# Patient Record
Sex: MALE | Race: WHITE | NOT HISPANIC OR LATINO | URBAN - METROPOLITAN AREA
[De-identification: names, ages, dates, MRNs, and addresses within clinical notes are randomized per-mention and may not be internally consistent; named-entity substitution may affect disease eponyms.]

---

## 2020-02-04 ENCOUNTER — EMERGENCY (EMERGENCY)
Facility: HOSPITAL | Age: 40
LOS: 0 days | Discharge: HOME | End: 2020-02-05
Attending: EMERGENCY MEDICINE
Payer: COMMERCIAL

## 2020-02-04 VITALS — HEIGHT: 70 IN | WEIGHT: 315 LBS

## 2020-02-04 VITALS — TEMPERATURE: 99 F | RESPIRATION RATE: 18 BRPM | HEART RATE: 100 BPM

## 2020-02-04 DIAGNOSIS — L03.116 CELLULITIS OF LEFT LOWER LIMB: ICD-10-CM

## 2020-02-04 DIAGNOSIS — K40.20 BILATERAL INGUINAL HERNIA, WITHOUT OBSTRUCTION OR GANGRENE, NOT SPECIFIED AS RECURRENT: ICD-10-CM

## 2020-02-04 DIAGNOSIS — R00.0 TACHYCARDIA, UNSPECIFIED: ICD-10-CM

## 2020-02-04 PROCEDURE — 74176 CT ABD & PELVIS W/O CONTRAST: CPT

## 2020-02-04 PROCEDURE — 93010 ELECTROCARDIOGRAM REPORT: CPT

## 2020-02-04 PROCEDURE — 93005 ELECTROCARDIOGRAM TRACING: CPT

## 2020-02-04 PROCEDURE — 99284 EMERGENCY DEPT VISIT MOD MDM: CPT

## 2020-02-04 PROCEDURE — 74176 CT ABD & PELVIS W/O CONTRAST: CPT | Mod: 26

## 2020-02-04 PROCEDURE — 99284 EMERGENCY DEPT VISIT MOD MDM: CPT | Mod: 25

## 2020-02-04 RX ORDER — CEPHALEXIN 500 MG
500 CAPSULE ORAL EVERY 12 HOURS
Refills: 0 | Status: DISCONTINUED | OUTPATIENT
Start: 2020-02-04 | End: 2020-02-05

## 2020-02-04 RX ADMIN — Medication 500 MILLIGRAM(S): at 22:11

## 2020-02-04 NOTE — ED STATDOCS - CARE PLAN
Principal Discharge DX:	Cellulitis of leg, left  Secondary Diagnosis:	Bilateral inguinal hernia without obstruction or gangrene, recurrence not specified

## 2020-02-04 NOTE — ED ADULT TRIAGE NOTE - CHIEF COMPLAINT QUOTE
Patient comes in with abscess on upper left thigh. Patient noticed increase in size 2 days ago. Denies fevers.

## 2020-02-04 NOTE — ED STATDOCS - NSFOLLOWUPINSTRUCTIONS_ED_ALL_ED_FT
Cellulitis    Cellulitis is a skin infection caused by bacteria. This condition occurs most often in the arms and lower legs but can occur anywhere over the body. Symptoms include redness, swelling, warm skin, tenderness, and chills/fever. If you were prescribed an antibiotic medicine, take it as told by your health care provider. Do not stop taking the antibiotic even if you start to feel better.    SEEK IMMEDIATE MEDICAL CARE IF YOU HAVE ANY OF THE FOLLOWING SYMPTOMS: worsening fever, red streaks coming from affected area, vomiting or diarrhea, or dizziness/lightheadedness.    Hernia    A hernia is when fat or the intestines push through a weak area in the abdominal wall. This can occur around the belly button (umbilical hernia) or where the leg meets the lower abdomen (inguinal hernia). This creates a rounded lump (bulge). Hernias that can be pushed back into the belly are called reducible and those that cannot are called incarcerated. Hernias that are not reducible and lose their blood supply are called strangulated and require emergency surgery. Hernias can be caused or worsened when straining during bowel movements or lifting heavy objects as well as coughing or sneezing. Surgery may be helpful in treating this condition so follow up with a surgeon.    SEEK IMMEDIATE MEDICAL CARE IF YOU HAVE ANY OF THE FOLLOWING SYMPTOMS: worsening abdominal pain, change in color over the hernia, nausea/vomiting, or inability to have a bowel movement or pass gas.

## 2020-02-04 NOTE — ED STATDOCS - ATTENDING CONTRIBUTION TO CARE
I, Tyler Burkett, performed the initial face to face bedside interview with this patient regarding history of present illness, review of symptoms and relevant past medical, social and family history.  I completed an independent physical examination.  I was the initial provider who evaluated this patient. I have signed out the follow up of any pending tests (i.e. labs, radiological studies) to the ACP.  I have communicated the patient’s plan of care and disposition with the ACP.  The history, relevant review of systems, past medical and surgical history, medical decision making, and physical examination was documented by the scribe in my presence and I attest to the accuracy of the documentation.

## 2020-02-04 NOTE — ED STATDOCS - CARE PROVIDER_API CALL
Charis Suarez)  Surgery; Surgical Critical Care  [address] [facility], [address] [place] [address]  Phone: [phone]  Fax: [phone]  Follow Up Time: 7-10 Days

## 2020-02-04 NOTE — ED ADULT NURSE NOTE - CHPI ED NUR SYMPTOMS NEG
no chills/no dizziness/no vomiting/no decreased eating/drinking/no fever/no nausea/no tingling/no weakness

## 2020-02-04 NOTE — ED STATDOCS - PATIENT PORTAL LINK FT
You can access the FollowMyHealth Patient Portal offered by [facility] by registering at the following website: [email or web address]. By joining [facility]’s FollowMyHealth portal, you will also be able to view your health information using other applications (apps) compatible with our system.

## 2020-02-04 NOTE — ED STATDOCS - OBJECTIVE STATEMENT
38 y/o male with no pertinent PMHx presents to the ED c/o mass to upper left thigh increasing in size x2 days. States he had an ingrown hair to the area that he would touched and squeezed in the past with pus drainage. Pt reports the mass greatly increased in size over last night, and it is erythematous with mild pain upon palpation. No pain without palpation. Denies abdominal pain. Last BM 1530 today without difficulty.

## 2020-02-04 NOTE — ED ADULT NURSE NOTE - OBJECTIVE STATEMENT
pt presents to the ED for inguinal abscess on left side. pt states the abscess has grown over the past twop days, is tender to the touch, is swollen, and is red, pt denies having any fevers at home. Pt denies drainage from the site at this time.

## 2020-02-04 NOTE — ED ADULT NURSE NOTE - NSIMPLEMENTINTERV_GEN_ALL_ED
Implemented All Universal Safety Interventions:  [place] to call system. Call bell, personal items and telephone within reach. Instruct patient to call for assistance. Room bathroom lighting operational. Non-slip footwear when patient is off stretcher. Physically safe environment: no spills, clutter or unnecessary equipment. Stretcher in lowest position, wheels locked, appropriate side rails in place.

## 2020-02-05 RX ORDER — CEPHALEXIN 500 MG
1 CAPSULE ORAL
Qty: 20 | Refills: 0
Start: 2020-02-05 | End: 2020-02-14

## 2020-02-05 RX ORDER — AZTREONAM 2 G
1 VIAL (EA) INJECTION
Qty: 20 | Refills: 0
Start: 2020-02-05 | End: 2020-02-14

## 2020-02-07 ENCOUNTER — INPATIENT (INPATIENT)
Facility: HOSPITAL | Age: 40
LOS: 5 days | Discharge: HOME | DRG: 854 | End: 2020-02-13
Attending: INTERNAL MEDICINE | Admitting: INTERNAL MEDICINE
Payer: COMMERCIAL

## 2020-02-07 VITALS — WEIGHT: 315 LBS | HEIGHT: 70 IN

## 2020-02-07 DIAGNOSIS — Z98.890 OTHER SPECIFIED POSTPROCEDURAL STATES: Chronic | ICD-10-CM

## 2020-02-07 DIAGNOSIS — Z97.2 PRESENCE OF DENTAL PROSTHETIC DEVICE (COMPLETE) (PARTIAL): Chronic | ICD-10-CM

## 2020-02-07 DIAGNOSIS — S52.123A DISPLACED FRACTURE OF HEAD OF UNSPECIFIED RADIUS, INITIAL ENCOUNTER FOR CLOSED FRACTURE: Chronic | ICD-10-CM

## 2020-02-07 DIAGNOSIS — L03.311 CELLULITIS OF ABDOMINAL WALL: ICD-10-CM

## 2020-02-07 LAB
ALBUMIN SERPL ELPH-MCNC: 3.6 G/DL — SIGNIFICANT CHANGE UP (ref 3.3–5)
ALP SERPL-CCNC: 106 U/L — SIGNIFICANT CHANGE UP (ref 40–120)
ALT FLD-CCNC: 35 U/L — SIGNIFICANT CHANGE UP (ref 12–78)
ANION GAP SERPL CALC-SCNC: 6 MMOL/L — SIGNIFICANT CHANGE UP (ref 5–17)
APTT BLD: 36.3 SEC — SIGNIFICANT CHANGE UP (ref 27.5–36.3)
AST SERPL-CCNC: 12 U/L — LOW (ref 15–37)
BASOPHILS # BLD AUTO: 0.1 K/UL — SIGNIFICANT CHANGE UP (ref 0–0.2)
BASOPHILS NFR BLD AUTO: 0.7 % — SIGNIFICANT CHANGE UP (ref 0–2)
BILIRUB SERPL-MCNC: 0.4 MG/DL — SIGNIFICANT CHANGE UP (ref 0.2–1.2)
BUN SERPL-MCNC: 11 MG/DL — SIGNIFICANT CHANGE UP (ref 7–23)
CALCIUM SERPL-MCNC: 8.9 MG/DL — SIGNIFICANT CHANGE UP (ref 8.5–10.1)
CHLORIDE SERPL-SCNC: 101 MMOL/L — SIGNIFICANT CHANGE UP (ref 96–108)
CO2 SERPL-SCNC: 25 MMOL/L — SIGNIFICANT CHANGE UP (ref 22–31)
CREAT SERPL-MCNC: 1.06 MG/DL — SIGNIFICANT CHANGE UP (ref 0.5–1.3)
EOSINOPHIL # BLD AUTO: 0.42 K/UL — SIGNIFICANT CHANGE UP (ref 0–0.5)
EOSINOPHIL NFR BLD AUTO: 2.9 % — SIGNIFICANT CHANGE UP (ref 0–6)
GLUCOSE SERPL-MCNC: 372 MG/DL — HIGH (ref 70–99)
HCT VFR BLD CALC: 44.3 % — SIGNIFICANT CHANGE UP (ref 39–50)
HGB BLD-MCNC: 14.6 G/DL — SIGNIFICANT CHANGE UP (ref 13–17)
IMM GRANULOCYTES NFR BLD AUTO: 0.4 % — SIGNIFICANT CHANGE UP (ref 0–1.5)
INR BLD: 1.07 RATIO — SIGNIFICANT CHANGE UP (ref 0.88–1.16)
LACTATE SERPL-SCNC: 2.1 MMOL/L — HIGH (ref 0.7–2)
LYMPHOCYTES # BLD AUTO: 17.1 % — SIGNIFICANT CHANGE UP (ref 13–44)
LYMPHOCYTES # BLD AUTO: 2.48 K/UL — SIGNIFICANT CHANGE UP (ref 1–3.3)
MCHC RBC-ENTMCNC: 30 PG — SIGNIFICANT CHANGE UP (ref 27–34)
MCHC RBC-ENTMCNC: 33 GM/DL — SIGNIFICANT CHANGE UP (ref 32–36)
MCV RBC AUTO: 91.2 FL — SIGNIFICANT CHANGE UP (ref 80–100)
MONOCYTES # BLD AUTO: 0.87 K/UL — SIGNIFICANT CHANGE UP (ref 0–0.9)
MONOCYTES NFR BLD AUTO: 6 % — SIGNIFICANT CHANGE UP (ref 2–14)
NEUTROPHILS # BLD AUTO: 10.54 K/UL — HIGH (ref 1.8–7.4)
NEUTROPHILS NFR BLD AUTO: 72.9 % — SIGNIFICANT CHANGE UP (ref 43–77)
PLATELET # BLD AUTO: 395 K/UL — SIGNIFICANT CHANGE UP (ref 150–400)
POTASSIUM SERPL-MCNC: 4.1 MMOL/L — SIGNIFICANT CHANGE UP (ref 3.5–5.3)
POTASSIUM SERPL-SCNC: 4.1 MMOL/L — SIGNIFICANT CHANGE UP (ref 3.5–5.3)
PROT SERPL-MCNC: 8.1 GM/DL — SIGNIFICANT CHANGE UP (ref 6–8.3)
PROTHROM AB SERPL-ACNC: 11.9 SEC — SIGNIFICANT CHANGE UP (ref 10–12.9)
RBC # BLD: 4.86 M/UL — SIGNIFICANT CHANGE UP (ref 4.2–5.8)
RBC # FLD: 11.9 % — SIGNIFICANT CHANGE UP (ref 10.3–14.5)
SODIUM SERPL-SCNC: 132 MMOL/L — LOW (ref 135–145)
WBC # BLD: 14.47 K/UL — HIGH (ref 3.8–10.5)
WBC # FLD AUTO: 14.47 K/UL — HIGH (ref 3.8–10.5)

## 2020-02-07 PROCEDURE — 84100 ASSAY OF PHOSPHORUS: CPT

## 2020-02-07 PROCEDURE — 87070 CULTURE OTHR SPECIMN AEROBIC: CPT

## 2020-02-07 PROCEDURE — 99223 1ST HOSP IP/OBS HIGH 75: CPT | Mod: AI

## 2020-02-07 PROCEDURE — 80202 ASSAY OF VANCOMYCIN: CPT

## 2020-02-07 PROCEDURE — 80048 BASIC METABOLIC PNL TOTAL CA: CPT

## 2020-02-07 PROCEDURE — 83605 ASSAY OF LACTIC ACID: CPT

## 2020-02-07 PROCEDURE — 36415 COLL VENOUS BLD VENIPUNCTURE: CPT

## 2020-02-07 PROCEDURE — 85025 COMPLETE CBC W/AUTO DIFF WBC: CPT

## 2020-02-07 PROCEDURE — 80061 LIPID PANEL: CPT

## 2020-02-07 PROCEDURE — 83735 ASSAY OF MAGNESIUM: CPT

## 2020-02-07 PROCEDURE — 87075 CULTR BACTERIA EXCEPT BLOOD: CPT

## 2020-02-07 PROCEDURE — 83036 HEMOGLOBIN GLYCOSYLATED A1C: CPT

## 2020-02-07 PROCEDURE — 82962 GLUCOSE BLOOD TEST: CPT

## 2020-02-07 PROCEDURE — 85027 COMPLETE CBC AUTOMATED: CPT

## 2020-02-07 PROCEDURE — 74177 CT ABD & PELVIS W/CONTRAST: CPT | Mod: 26

## 2020-02-07 RX ORDER — DEXTROSE 50 % IN WATER 50 %
25 SYRINGE (ML) INTRAVENOUS ONCE
Refills: 0 | Status: DISCONTINUED | OUTPATIENT
Start: 2020-02-07 | End: 2020-02-13

## 2020-02-07 RX ORDER — PIPERACILLIN AND TAZOBACTAM 4; .5 G/20ML; G/20ML
3.38 INJECTION, POWDER, LYOPHILIZED, FOR SOLUTION INTRAVENOUS EVERY 8 HOURS
Refills: 0 | Status: DISCONTINUED | OUTPATIENT
Start: 2020-02-08 | End: 2020-02-08

## 2020-02-07 RX ORDER — PIPERACILLIN AND TAZOBACTAM 4; .5 G/20ML; G/20ML
3.38 INJECTION, POWDER, LYOPHILIZED, FOR SOLUTION INTRAVENOUS ONCE
Refills: 0 | Status: COMPLETED | OUTPATIENT
Start: 2020-02-07 | End: 2020-02-07

## 2020-02-07 RX ORDER — GLUCAGON INJECTION, SOLUTION 0.5 MG/.1ML
1 INJECTION, SOLUTION SUBCUTANEOUS ONCE
Refills: 0 | Status: DISCONTINUED | OUTPATIENT
Start: 2020-02-07 | End: 2020-02-13

## 2020-02-07 RX ORDER — ACETAMINOPHEN 500 MG
650 TABLET ORAL EVERY 6 HOURS
Refills: 0 | Status: DISCONTINUED | OUTPATIENT
Start: 2020-02-07 | End: 2020-02-12

## 2020-02-07 RX ORDER — INSULIN LISPRO 100/ML
VIAL (ML) SUBCUTANEOUS AT BEDTIME
Refills: 0 | Status: DISCONTINUED | OUTPATIENT
Start: 2020-02-07 | End: 2020-02-13

## 2020-02-07 RX ORDER — ONDANSETRON 8 MG/1
4 TABLET, FILM COATED ORAL EVERY 6 HOURS
Refills: 0 | Status: DISCONTINUED | OUTPATIENT
Start: 2020-02-07 | End: 2020-02-13

## 2020-02-07 RX ORDER — SODIUM CHLORIDE 9 MG/ML
1000 INJECTION INTRAMUSCULAR; INTRAVENOUS; SUBCUTANEOUS ONCE
Refills: 0 | Status: COMPLETED | OUTPATIENT
Start: 2020-02-07 | End: 2020-02-07

## 2020-02-07 RX ORDER — VANCOMYCIN HCL 1 G
1000 VIAL (EA) INTRAVENOUS EVERY 12 HOURS
Refills: 0 | Status: DISCONTINUED | OUTPATIENT
Start: 2020-02-08 | End: 2020-02-08

## 2020-02-07 RX ORDER — VANCOMYCIN HCL 1 G
2000 VIAL (EA) INTRAVENOUS ONCE
Refills: 0 | Status: COMPLETED | OUTPATIENT
Start: 2020-02-07 | End: 2020-02-07

## 2020-02-07 RX ORDER — DEXTROSE 50 % IN WATER 50 %
12.5 SYRINGE (ML) INTRAVENOUS ONCE
Refills: 0 | Status: DISCONTINUED | OUTPATIENT
Start: 2020-02-07 | End: 2020-02-13

## 2020-02-07 RX ORDER — VANCOMYCIN HCL 1 G
1000 VIAL (EA) INTRAVENOUS ONCE
Refills: 0 | Status: DISCONTINUED | OUTPATIENT
Start: 2020-02-07 | End: 2020-02-07

## 2020-02-07 RX ORDER — INSULIN LISPRO 100/ML
VIAL (ML) SUBCUTANEOUS
Refills: 0 | Status: DISCONTINUED | OUTPATIENT
Start: 2020-02-07 | End: 2020-02-08

## 2020-02-07 RX ORDER — IBUPROFEN 200 MG
600 TABLET ORAL EVERY 6 HOURS
Refills: 0 | Status: DISCONTINUED | OUTPATIENT
Start: 2020-02-07 | End: 2020-02-13

## 2020-02-07 RX ORDER — DEXTROSE 50 % IN WATER 50 %
15 SYRINGE (ML) INTRAVENOUS ONCE
Refills: 0 | Status: DISCONTINUED | OUTPATIENT
Start: 2020-02-07 | End: 2020-02-13

## 2020-02-07 RX ADMIN — PIPERACILLIN AND TAZOBACTAM 200 GRAM(S): 4; .5 INJECTION, POWDER, LYOPHILIZED, FOR SOLUTION INTRAVENOUS at 20:43

## 2020-02-07 RX ADMIN — SODIUM CHLORIDE 1000 MILLILITER(S): 9 INJECTION INTRAMUSCULAR; INTRAVENOUS; SUBCUTANEOUS at 20:00

## 2020-02-07 RX ADMIN — PIPERACILLIN AND TAZOBACTAM 3.38 GRAM(S): 4; .5 INJECTION, POWDER, LYOPHILIZED, FOR SOLUTION INTRAVENOUS at 21:10

## 2020-02-07 RX ADMIN — Medication 2000 MILLIGRAM(S): at 23:40

## 2020-02-07 RX ADMIN — SODIUM CHLORIDE 1000 MILLILITER(S): 9 INJECTION INTRAMUSCULAR; INTRAVENOUS; SUBCUTANEOUS at 21:00

## 2020-02-07 RX ADMIN — Medication 250 MILLIGRAM(S): at 21:16

## 2020-02-07 RX ADMIN — Medication 600 MILLIGRAM(S): at 22:32

## 2020-02-07 RX ADMIN — SODIUM CHLORIDE 1000 MILLILITER(S): 9 INJECTION INTRAMUSCULAR; INTRAVENOUS; SUBCUTANEOUS at 20:50

## 2020-02-07 NOTE — H&P ADULT - HISTORY OF PRESENT ILLNESS
38 yo M with no pertinent PMH presents with upper thigh and lower abdominal pain and swelling.    He was seen 3 days ago for left upper thigh swelling, and was prescribed a 10 day course of BID Keflex and BID Bactrim, but his skin has gotten more purple/black.    In the ED, he was given Vancomycin 2g IV x1, Zosyn 3.375g IV x1, and NS 1L x2. 40 yo M with no pertinent PMH presents with left upper thigh pain and swelling. His symptoms originally began 4 days ago with swelling and redness. He was seen in the ED the day after and was diagnosed with cellulitis and prescribed a 10 day course of BID Keflex and BID Bactrim. His swelling never truly improved, but today, his pain and redness worsened, along with a "black spot" that developed, making him concerned for necrosis. He denies fevers, chills, nausea, vomiting, abdominal pain, rash, or swelling anywhere else, diarrhea, constipation, blood in stool, dysuria, hematuria, urinary discharge, or problems with sex. He does note a previous ingrown hair at that site that he used to pick at in the past. No trauma to that site. No known personal history of diabetes (mother had diabetes). No weight changes.    In the ED, he was given Vancomycin 2g IV x1, Zosyn 3.375g IV x1, and NS 1L x2.

## 2020-02-07 NOTE — ED ADULT TRIAGE NOTE - CHIEF COMPLAINT QUOTE
pt c/o L. inner thigh cellulitis. reports he was seen in ED on tuesday and dx w/ cellulitis, put on PO abx. now the pain and swelling has worsened and pt states, "there is a black spot" on his inner thigh. ibuprofen taken at 3pm today w/ partial relief.

## 2020-02-07 NOTE — H&P ADULT - NSHPSOCIALHISTORY_GEN_ALL_CORE
Former smoker, 1 pack per day intermittently for 30 years, stopped 1 month ago (1/1/20). Patient vapes occasionally since stopping.  No significant alcohol or drug use.  Sexually active with his girlfriend, does not use protection.  Works for [employer].

## 2020-02-07 NOTE — H&P ADULT - NSHPLABSRESULTS_GEN_ALL_CORE
Labs personally reviewed and interpreted. Notable for leukocytosis (WBC 14.47) with increased absolute neutrophil count but no increased percentage. Hb 14.6, plts 395, INR 1.07, Na slightly low at 132, K 4.1, Cl 101, HCO3 25, BUN/creatinine 11/1.06 (no prior labwork seen). BG markedly elevated at 372 with normal AG of 6. LFTs wnl. Lactate slightly elevated at 2.1.    CT A/P with IV contrast personally reviewed and interpreted. Notable for minimal symmetric subcutaneous fat stranding in the lateral lower abdominal wall bilaterally, which could be due to infection versus mild third spacing of fluid. No focal fluid collection or abscess. Scattered small benign-appearing bilateral inguinal lymph nodes noted.    No EKG obtained today. EKG from 2/4/20 personally reviewed.

## 2020-02-07 NOTE — H&P ADULT - NSICDXFAMILYHX_GEN_ALL_CORE_FT
FAMILY HISTORY:  FH: diabetes mellitus, mother  FH: Parkinson's disease, father  FHx: heart disease, maternal grandfather

## 2020-02-07 NOTE — ED ADULT NURSE REASSESSMENT NOTE - NS ED NURSE REASSESS COMMENT FT1
Vancomycin infusing with no redness, swelling or infiltration noted at the site.  MD Cano in ED evaluating patient at this time.  Patients left thigh and left abdomen, reddened with a dime size circular black spot noted.  the skin is warm to the touch and feels indurated.  Patient denies any drainage from the area.  Patient awaiting a bed at his time.  Will continue to monitor patient's condition.

## 2020-02-07 NOTE — ED STATDOCS - OBJECTIVE STATEMENT
38 y/o male with no pertinent PMHx, presents to the ED c/o cellulitis to left thigh. Pt was seen 4 days ago for sx, and was given abx keflex and bactrim. Today, noticed that it began turning purple and black. Came to [facility] for further evaluation. Denies drainage. Denies hx of DM. Will send pt to main ED for further evaluation.

## 2020-02-07 NOTE — H&P ADULT - ASSESSMENT
38 yo M with no pertinent PMH presents with upper thigh and lower abdominal pain and swelling, found to be due to __________________ 38 yo M with no pertinent PMH presents with left upper thigh pain and swelling, found to be due to cellulitis.    1) Cellulitis of left thigh  - Upper thigh, with pain, failed outpatient PO antibiotic therapy  - No abscess seen on CT, no concern for necrotizing fasciitis at this time  - With dark lesion, cannot rule out other superimposed skin infections (?ecthyma gangrenosum)  - Patient likely has underlying diabetes predisposing to infection  - Will start with broad antibiotic coverage (Vanc/Zosyn) to cover for both MRSA and pseudomonas. Initial Vanc dose weight based, but subsequent doses at 1g Q12, spoke with pharmacy  - F/u Vanc troughs  - Elevate leg  - F/u blood cx  - ID consult    2) Sepsis  - Meets sepsis criteria with leukocytosis and tachycardia on admission  - With mild lactic acidosis, s/p 2L IVFs  - F/u repeat lactate  - s/p Vanc/Zosyn, will continue  - F/u blood cx  - ID consult    3) Lactic acidosis  - Mild, 2.1 on admission  - As noted above, s/p 2L IVFs  - F/u repeat lactate  - Cellulitis workup otherwise noted    4) Hyperglycemia  -  on admission  - Patient acknowledged he drank a soda right before ED arrival, but still suspect patient has undiagnosed diabetes  - Low carb diet  - Check FS QAC + HS with low dose SSI to avoid DKA  - Check A1C and lipid panel in AM    5) Prophylactic measure  - DVT PPX: IMPROVE score of 0, low risk, no pharmacological PPX needed, will give SCDs  - Diet: consistent carb  - Dispo: pending ID eval, improvement in sxs, and abx plan

## 2020-02-07 NOTE — H&P ADULT - NSHPREVIEWOFSYSTEMS_GEN_ALL_CORE
Gen: negative for fevers, chills, weight loss, weight gain, malaise, fatigue  Eyes: no blurred vision or lacrimation  ENT: no tinnitus, vertigo, or decreased hearing  Resp: no wheezing, dyspnea, pleuritic chest pain, hemoptysis, or orthopnea  CV: no chest pain, dyspnea on exertion, or palpitations  GI: no nausea, vomiting, abdominal pain, diarrhea, constipation, melena, or hematochezia  : no dysuria, hematuria, discharge, or incontinence  MSK: + thigh pain. No arthralgias or joint swelling  Neuro: no focal deficits, confusion, weakness, dizziness, tremors, or seizures  Skin: + rash, lesions, edema

## 2020-02-07 NOTE — ED STATDOCS - PROGRESS NOTE DETAILS
Patient seen and evaluated.  +cellulitis to L lower abdomen and upper thigh, failing outpatient abx, no fluid collection on CT, +Leukocytosis, +lactate.  Recommended admission, patient agreeable.  Case endorsed to Dr. Ashley Gonzalez PA-C

## 2020-02-07 NOTE — H&P ADULT - NSHPPHYSICALEXAM_GEN_ALL_CORE
Vital Signs Last 24 Hrs  T(C): 36.9 (07 Feb 2020 22:26), Max: 36.9 (07 Feb 2020 22:26)  T(F): 98.5 (07 Feb 2020 22:26), Max: 98.5 (07 Feb 2020 22:26)  HR: 88 (07 Feb 2020 22:26) (88 - 100)  BP: 132/89 (07 Feb 2020 22:26) (132/89 - 144/85)  BP(mean): 102 (07 Feb 2020 19:09) (102 - 102)  RR: 18 (07 Feb 2020 22:26) (18 - 18)  SpO2: 100% (07 Feb 2020 22:26) (97% - 100%) Vital Signs Last 24 Hrs  T(C): 36.9 (07 Feb 2020 22:26), Max: 36.9 (07 Feb 2020 22:26)  T(F): 98.5 (07 Feb 2020 22:26), Max: 98.5 (07 Feb 2020 22:26)  HR: 88 (07 Feb 2020 22:26) (88 - 100)  BP: 132/89 (07 Feb 2020 22:26) (132/89 - 144/85)  BP(mean): 102 (07 Feb 2020 19:09) (102 - 102)  RR: 18 (07 Feb 2020 22:26) (18 - 18)  SpO2: 100% (07 Feb 2020 22:26) (97% - 100%)    GENERAL: No acute distress, morbidly obese  HEENT: PERRL, EOMI, MM dry, no oropharyngeal lesions  NECK: supple, no stiffness, no JVD, no thyromegaly  PULM: respirations non-labored, clear to auscultation bilaterally, no rales, rhonchi, or wheezes  CV: regular rate and rhythm, no murmurs, gallops, or rubs  GI: abdomen soft, nontender, nondistended, no masses felt, normal bowel sounds. No obvious hernias appreciated.  : no genital lesions or ulcers  MSK: strength 5/5 bilateral upper/lower extremities. No joint swelling, erythema, or warmth.  LYMPH: no anterior cervical, posterior cervical, supraclavicular, axillary, or inguinal lymphadenopathy  NEURO: A&Ox3, no tremors, sensation intact  SKIN: Large left upper medial thigh swelling, warmth, and redness, with large focal upper area of induration, and pain. Focal dark-grey lesion about 2x3cm within the upper part of the thigh swelling, without drainage or TTP at that side. No fluctuance appreciated. Trace bilateral lower leg edema with hyperpigmented non-raised lower leg lesions

## 2020-02-08 LAB
ANION GAP SERPL CALC-SCNC: 6 MMOL/L — SIGNIFICANT CHANGE UP (ref 5–17)
BASOPHILS # BLD AUTO: 0.09 K/UL — SIGNIFICANT CHANGE UP (ref 0–0.2)
BASOPHILS NFR BLD AUTO: 0.8 % — SIGNIFICANT CHANGE UP (ref 0–2)
BUN SERPL-MCNC: 9 MG/DL — SIGNIFICANT CHANGE UP (ref 7–23)
CALCIUM SERPL-MCNC: 8.3 MG/DL — LOW (ref 8.5–10.1)
CHLORIDE SERPL-SCNC: 103 MMOL/L — SIGNIFICANT CHANGE UP (ref 96–108)
CHOLEST SERPL-MCNC: 182 MG/DL — SIGNIFICANT CHANGE UP (ref 10–199)
CO2 SERPL-SCNC: 25 MMOL/L — SIGNIFICANT CHANGE UP (ref 22–31)
CREAT SERPL-MCNC: 0.8 MG/DL — SIGNIFICANT CHANGE UP (ref 0.5–1.3)
EOSINOPHIL # BLD AUTO: 0.51 K/UL — HIGH (ref 0–0.5)
EOSINOPHIL NFR BLD AUTO: 4.3 % — SIGNIFICANT CHANGE UP (ref 0–6)
GLUCOSE SERPL-MCNC: 244 MG/DL — HIGH (ref 70–99)
HBA1C BLD-MCNC: 10.6 % — HIGH (ref 4–5.6)
HCT VFR BLD CALC: 41.2 % — SIGNIFICANT CHANGE UP (ref 39–50)
HDLC SERPL-MCNC: 35 MG/DL — LOW
HGB BLD-MCNC: 13.8 G/DL — SIGNIFICANT CHANGE UP (ref 13–17)
IMM GRANULOCYTES NFR BLD AUTO: 0.8 % — SIGNIFICANT CHANGE UP (ref 0–1.5)
LIPID PNL WITH DIRECT LDL SERPL: 129 MG/DL — HIGH
LYMPHOCYTES # BLD AUTO: 1.8 K/UL — SIGNIFICANT CHANGE UP (ref 1–3.3)
LYMPHOCYTES # BLD AUTO: 15.1 % — SIGNIFICANT CHANGE UP (ref 13–44)
MAGNESIUM SERPL-MCNC: 1.9 MG/DL — SIGNIFICANT CHANGE UP (ref 1.6–2.6)
MCHC RBC-ENTMCNC: 30.7 PG — SIGNIFICANT CHANGE UP (ref 27–34)
MCHC RBC-ENTMCNC: 33.5 GM/DL — SIGNIFICANT CHANGE UP (ref 32–36)
MCV RBC AUTO: 91.6 FL — SIGNIFICANT CHANGE UP (ref 80–100)
MONOCYTES # BLD AUTO: 0.91 K/UL — HIGH (ref 0–0.9)
MONOCYTES NFR BLD AUTO: 7.6 % — SIGNIFICANT CHANGE UP (ref 2–14)
NEUTROPHILS # BLD AUTO: 8.55 K/UL — HIGH (ref 1.8–7.4)
NEUTROPHILS NFR BLD AUTO: 71.4 % — SIGNIFICANT CHANGE UP (ref 43–77)
PHOSPHATE SERPL-MCNC: 2.9 MG/DL — SIGNIFICANT CHANGE UP (ref 2.5–4.5)
PLATELET # BLD AUTO: 329 K/UL — SIGNIFICANT CHANGE UP (ref 150–400)
POTASSIUM SERPL-MCNC: 3.9 MMOL/L — SIGNIFICANT CHANGE UP (ref 3.5–5.3)
POTASSIUM SERPL-SCNC: 3.9 MMOL/L — SIGNIFICANT CHANGE UP (ref 3.5–5.3)
RBC # BLD: 4.5 M/UL — SIGNIFICANT CHANGE UP (ref 4.2–5.8)
RBC # FLD: 12.1 % — SIGNIFICANT CHANGE UP (ref 10.3–14.5)
SODIUM SERPL-SCNC: 134 MMOL/L — LOW (ref 135–145)
TOTAL CHOLESTEROL/HDL RATIO MEASUREMENT: 5.3 RATIO — SIGNIFICANT CHANGE UP (ref 3.4–9.6)
TRIGL SERPL-MCNC: 91 MG/DL — SIGNIFICANT CHANGE UP (ref 10–149)
WBC # BLD: 11.95 K/UL — HIGH (ref 3.8–10.5)
WBC # FLD AUTO: 11.95 K/UL — HIGH (ref 3.8–10.5)

## 2020-02-08 PROCEDURE — 99232 SBSQ HOSP IP/OBS MODERATE 35: CPT

## 2020-02-08 RX ORDER — INSULIN LISPRO 100/ML
VIAL (ML) SUBCUTANEOUS
Refills: 0 | Status: DISCONTINUED | OUTPATIENT
Start: 2020-02-08 | End: 2020-02-13

## 2020-02-08 RX ORDER — SODIUM CHLORIDE 9 MG/ML
1000 INJECTION, SOLUTION INTRAVENOUS
Refills: 0 | Status: DISCONTINUED | OUTPATIENT
Start: 2020-02-08 | End: 2020-02-13

## 2020-02-08 RX ORDER — INFLUENZA VIRUS VACCINE 15; 15; 15; 15 UG/.5ML; UG/.5ML; UG/.5ML; UG/.5ML
0.5 SUSPENSION INTRAMUSCULAR ONCE
Refills: 0 | Status: DISCONTINUED | OUTPATIENT
Start: 2020-02-08 | End: 2020-02-13

## 2020-02-08 RX ORDER — CEFTRIAXONE 500 MG/1
2000 INJECTION, POWDER, FOR SOLUTION INTRAMUSCULAR; INTRAVENOUS EVERY 24 HOURS
Refills: 0 | Status: DISCONTINUED | OUTPATIENT
Start: 2020-02-08 | End: 2020-02-13

## 2020-02-08 RX ORDER — VANCOMYCIN HCL 1 G
1250 VIAL (EA) INTRAVENOUS EVERY 12 HOURS
Refills: 0 | Status: DISCONTINUED | OUTPATIENT
Start: 2020-02-08 | End: 2020-02-09

## 2020-02-08 RX ADMIN — Medication 2: at 10:13

## 2020-02-08 RX ADMIN — Medication 2: at 17:27

## 2020-02-08 RX ADMIN — Medication 166.67 MILLIGRAM(S): at 18:54

## 2020-02-08 RX ADMIN — Medication 250 MILLIGRAM(S): at 09:51

## 2020-02-08 RX ADMIN — Medication 2: at 14:16

## 2020-02-08 RX ADMIN — Medication 600 MILLIGRAM(S): at 17:25

## 2020-02-08 RX ADMIN — CEFTRIAXONE 2000 MILLIGRAM(S): 500 INJECTION, POWDER, FOR SOLUTION INTRAMUSCULAR; INTRAVENOUS at 12:37

## 2020-02-08 RX ADMIN — PIPERACILLIN AND TAZOBACTAM 25 GRAM(S): 4; .5 INJECTION, POWDER, LYOPHILIZED, FOR SOLUTION INTRAVENOUS at 05:27

## 2020-02-08 RX ADMIN — Medication 600 MILLIGRAM(S): at 15:08

## 2020-02-08 NOTE — CONSULT NOTE ADULT - ASSESSMENT
40 y/o Male with no pertinent PMH was admitted on 2/7 with left upper thigh pain and swelling. His symptoms originally began 4 days PTA with swelling and redness. He was seen in the ED the day after and was diagnosed with cellulitis and prescribed BID Keflex and BID Bactrim. His swelling never truly improved, but today, his pain and redness worsened, along with a "black spot" that developed, making him concerned for necrosis. He denies fevers, chills at home. He does note a previous ingrown hair at that site that he used to pick at in the past. No trauma to that site. In ER he received vancomycin IV and zosyn.     1. Left LE cellulitis. 40 y/o Male with h/o obesity was admitted on 2/7 with left upper thigh pain and swelling. His symptoms originally began 4 days PTA with swelling and redness. He was seen in the ED the day after and was diagnosed with cellulitis and prescribed BID Keflex and BID Bactrim. His swelling never truly improved, but today, his pain and redness worsened, along with a "black spot" that developed, making him concerned for necrosis. He denies fevers, chills at home. He does note a previous ingrown hair at that site that he used to pick at in the past. No trauma to that site. In ER he received vancomycin IV and zosyn.     1. Left LE cellulitis. Possible early abscess. Possible MRSA infection. Obesity.   -leukocytosis  -obtain BC x 2  -would culture collected  -start vancomycin 1250 mg IV q12h and ceftriaxone 2 gm IV qd  -reason for abx use and side effects reviewed with patient; monitor BMP and vancomycin trough levels   -local wound care  -old chart reviewed to assess prior cultures  -monitor temps  -f/u CBC  -supportive care  2. Other issues:   -care per medicine

## 2020-02-08 NOTE — CONSULT NOTE ADULT - SUBJECTIVE AND OBJECTIVE BOX
Patient is a 39y old  Male who presents with a chief complaint of cellulitis     HPI:  38 y/o Male with no pertinent PMH was admitted on 2/7 with left upper thigh pain and swelling. His symptoms originally began 4 days PTA with swelling and redness. He was seen in the ED the day after and was diagnosed with cellulitis and prescribed BID Keflex and BID Bactrim. His swelling never truly improved, but today, his pain and redness worsened, along with a "black spot" that developed, making him concerned for necrosis. He denies fevers, chills at home. He does note a previous ingrown hair at that site that he used to pick at in the past. No trauma to that site. In ER he received vancomycin IV and zosyn.     PMH: as above  PSH: as above  Meds: per reconciliation sheet, noted below  MEDICATIONS  (STANDING):  dextrose 50% Injectable 12.5 Gram(s) IV Push once  dextrose 50% Injectable 25 Gram(s) IV Push once  dextrose 50% Injectable 25 Gram(s) IV Push once  influenza   Vaccine 0.5 milliLiter(s) IntraMuscular once  insulin lispro (HumaLOG) corrective regimen sliding scale   SubCutaneous three times a day before meals  insulin lispro (HumaLOG) corrective regimen sliding scale   SubCutaneous at bedtime  piperacillin/tazobactam IVPB.. 3.375 Gram(s) IV Intermittent every 8 hours  vancomycin  IVPB 1000 milliGRAM(s) IV Intermittent every 12 hours    MEDICATIONS  (PRN):  acetaminophen   Tablet .. 650 milliGRAM(s) Oral every 6 hours PRN Mild Pain (1 - 3)  dextrose 40% Gel 15 Gram(s) Oral once PRN Blood Glucose LESS THAN 70 milliGRAM(s)/deciliter  glucagon  Injectable 1 milliGRAM(s) IntraMuscular once PRN Glucose LESS THAN 70 milligrams/deciliter  ibuprofen  Tablet. 600 milliGRAM(s) Oral every 6 hours PRN Mild Pain (1 - 3)  ondansetron Injectable 4 milliGRAM(s) IV Push every 6 hours PRN Nausea and/or Vomiting    Allergies    No Known Allergies    Intolerances      Social: no smoking, no alcohol, no illegal drugs; no recent travel, no exposure to TB  FAMILY HISTORY:  FH: Parkinson's disease: father  FHx: heart disease: maternal grandfather  FH: diabetes mellitus: mother    no history of premature cardiovascular disease in first degree relatives    ROS: the patient denies fever, no chills, no HA, no seizures, no dizziness, no sore throat, no nasal congestion, no blurry vision, no CP, no palpitations, no SOB, no cough, no abdominal pain, no diarrhea, no N/V, no dysuria, no leg pain, no claudication, no rash, no joint aches, no rectal pain or bleeding, no night sweats  All other systems reviewed and are negative    Vital Signs Last 24 Hrs  T(C): 36.8 (08 Feb 2020 05:25), Max: 37.1 (08 Feb 2020 00:05)  T(F): 98.3 (08 Feb 2020 05:25), Max: 98.7 (08 Feb 2020 00:05)  HR: 91 (08 Feb 2020 05:25) (88 - 100)  BP: 109/63 (08 Feb 2020 05:25) (109/63 - 147/85)  BP(mean): 102 (07 Feb 2020 19:09) (102 - 102)  RR: 16 (08 Feb 2020 05:25) (16 - 18)  SpO2: 97% (08 Feb 2020 05:25) (97% - 100%)  Daily Height in cm: 177.8 (07 Feb 2020 18:51)    Daily     PE:    Constitutional:  No acute distress  HEENT: NC/AT, EOMI, PERRLA, conjunctivae clear; ears and nose atraumatic; pharynx benign  Neck: supple; thyroid not palpable  Back: no tenderness  Respiratory: respiratory effort normal; clear to auscultation  Cardiovascular: S1S2 regular, no murmurs  Abdomen: soft, not tender, not distended, positive BS; no liver or spleen organomegaly  Genitourinary: no suprapubic tenderness  Lymphatic: no LN palpable  Musculoskeletal: no muscle tenderness, no joint swelling or tenderness  Extremities: no pedal edema  Neurological/ Psychiatric: AxOx3, judgement and insight normal; moving all extremities  Skin: no rashes; no palpable lesions    Labs: all available labs reviewed                        14.6   14.47 )-----------( 395      ( 07 Feb 2020 19:58 )             44.3     02-07    132<L>  |  101  |  11  ----------------------------<  372<H>  4.1   |  25  |  1.06    Ca    8.9      07 Feb 2020 19:58    TPro  8.1  /  Alb  3.6  /  TBili  0.4  /  DBili  x   /  AST  12<L>  /  ALT  35  /  AlkPhos  106  02-07     LIVER FUNCTIONS - ( 07 Feb 2020 19:58 )  Alb: 3.6 g/dL / Pro: 8.1 gm/dL / ALK PHOS: 106 U/L / ALT: 35 U/L / AST: 12 U/L / GGT: x                 Radiology: all available radiological tests reviewed    Advanced directives addressed: full resuscitation Patient is a 39y old  Male who presents with a chief complaint of cellulitis     HPI:  38 y/o Male with h/o obesity was admitted on 2/7 with left upper thigh pain and swelling. His symptoms originally began 4 days PTA with swelling and redness. He was seen in the ED the day after and was diagnosed with cellulitis and prescribed BID Keflex and BID Bactrim. His swelling never truly improved, but today, his pain and redness worsened, along with a "black spot" that developed, making him concerned for necrosis. He denies fevers, chills at home. He does note a previous ingrown hair at that site that he used to pick at in the past. No trauma to that site. In ER he received vancomycin IV and zosyn.     PMH: as above  PSH: as above  Meds: per reconciliation sheet, noted below  MEDICATIONS  (STANDING):  dextrose 50% Injectable 12.5 Gram(s) IV Push once  dextrose 50% Injectable 25 Gram(s) IV Push once  dextrose 50% Injectable 25 Gram(s) IV Push once  influenza   Vaccine 0.5 milliLiter(s) IntraMuscular once  insulin lispro (HumaLOG) corrective regimen sliding scale   SubCutaneous three times a day before meals  insulin lispro (HumaLOG) corrective regimen sliding scale   SubCutaneous at bedtime  piperacillin/tazobactam IVPB.. 3.375 Gram(s) IV Intermittent every 8 hours  vancomycin  IVPB 1000 milliGRAM(s) IV Intermittent every 12 hours    MEDICATIONS  (PRN):  acetaminophen   Tablet .. 650 milliGRAM(s) Oral every 6 hours PRN Mild Pain (1 - 3)  dextrose 40% Gel 15 Gram(s) Oral once PRN Blood Glucose LESS THAN 70 milliGRAM(s)/deciliter  glucagon  Injectable 1 milliGRAM(s) IntraMuscular once PRN Glucose LESS THAN 70 milligrams/deciliter  ibuprofen  Tablet. 600 milliGRAM(s) Oral every 6 hours PRN Mild Pain (1 - 3)  ondansetron Injectable 4 milliGRAM(s) IV Push every 6 hours PRN Nausea and/or Vomiting    Allergies    No Known Allergies    Intolerances      Social: no smoking, no alcohol, no illegal drugs; no recent travel, no exposure to TB  FAMILY HISTORY:  FH: Parkinson's disease: father  FHx: heart disease: maternal grandfather  FH: diabetes mellitus: mother    no history of premature cardiovascular disease in first degree relatives    ROS: the patient denies fever, no chills, no HA, no seizures, no dizziness, no sore throat, no nasal congestion, no blurry vision, no CP, no palpitations, no SOB, no cough, no abdominal pain, no diarrhea, no N/V, no dysuria, has left thigh pain, no claudication, has left thigh rash, no joint aches, no rectal pain or bleeding, no night sweats  All other systems reviewed and are negative    Vital Signs Last 24 Hrs  T(C): 36.8 (08 Feb 2020 05:25), Max: 37.1 (08 Feb 2020 00:05)  T(F): 98.3 (08 Feb 2020 05:25), Max: 98.7 (08 Feb 2020 00:05)  HR: 91 (08 Feb 2020 05:25) (88 - 100)  BP: 109/63 (08 Feb 2020 05:25) (109/63 - 147/85)  BP(mean): 102 (07 Feb 2020 19:09) (102 - 102)  RR: 16 (08 Feb 2020 05:25) (16 - 18)  SpO2: 97% (08 Feb 2020 05:25) (97% - 100%)  Daily Height in cm: 177.8 (07 Feb 2020 18:51)    Daily     PE:    Constitutional:  No acute distress  HEENT: NC/AT, EOMI, PERRLA, conjunctivae clear; ears and nose atraumatic; pharynx benign  Neck: supple; thyroid not palpable  Back: no tenderness  Respiratory: respiratory effort normal; clear to auscultation  Cardiovascular: S1S2 regular, no murmurs  Abdomen: soft, not tender, not distended, positive BS; no liver or spleen organomegaly  Genitourinary: no suprapubic tenderness  Lymphatic: no LN palpable  Musculoskeletal: no muscle tenderness, no joint swelling or tenderness  Extremities: no pedal edema  Left medial thigh area or redness, induration, tenderness, warmth; broken blister noted with some superficial necrosis; scant drainage; no fluctuence  Neurological/ Psychiatric: AxOx3, judgement and insight normal; moving all extremities  Skin: no rashes; no palpable lesions    Labs: all available labs reviewed                        14.6   14.47 )-----------( 395      ( 07 Feb 2020 19:58 )             44.3     02-07    132<L>  |  101  |  11  ----------------------------<  372<H>  4.1   |  25  |  1.06    Ca    8.9      07 Feb 2020 19:58    TPro  8.1  /  Alb  3.6  /  TBili  0.4  /  DBili  x   /  AST  12<L>  /  ALT  35  /  AlkPhos  106  02-07     LIVER FUNCTIONS - ( 07 Feb 2020 19:58 )  Alb: 3.6 g/dL / Pro: 8.1 gm/dL / ALK PHOS: 106 U/L / ALT: 35 U/L / AST: 12 U/L / GGT: x                 Radiology: all available radiological tests reviewed    Advanced directives addressed: full resuscitation

## 2020-02-08 NOTE — PATIENT PROFILE ADULT - FUNCTIONAL SCREEN CURRENT LEVEL: EATING, MLM
Mom lvm requesting lab order for pt before the next upcoming visit on Tuesday 11/27/19 she would like a call back once orders are placed    0 = independent

## 2020-02-08 NOTE — PROGRESS NOTE ADULT - SUBJECTIVE AND OBJECTIVE BOX
HOSPITALIST PROGRESS NOTE:  SUBJECTIVE:  PCP:  Chief Complaint: Patient is a 39y old  Male who presents with a chief complaint of cellulitis (07 Feb 2020 22:44)      HPI:  38 yo M with no pertinent PMH presents with left upper thigh pain and swelling. His symptoms originally began 4 days ago with swelling and redness. He was seen in the ED the day after and was diagnosed with cellulitis and prescribed a 10 day course of BID Keflex and BID Bactrim. His swelling never truly improved, but today, his pain and redness worsened, along with a "black spot" that developed, making him concerned for necrosis. He denies fevers, chills, nausea, vomiting, abdominal pain, rash, or swelling anywhere else, diarrhea, constipation, blood in stool, dysuria, hematuria, urinary discharge, or problems with sex. He does note a previous ingrown hair at that site that he used to pick at in the past. No trauma to that site. No known personal history of diabetes (mother had diabetes). No weight changes.  In the ED, he was given Vancomycin 2g IV x1, Zosyn 3.375g IV x1, and NS 1L x2. (07 Feb 2020 22:44)    2/8:  Above reviewed;     Allergies:  No Known Allergies    REVIEW OF SYSTEMS:  See HPI. All other review of systems is negative unless indicated above.     OBJECTIVE  Physical Exam:  Vital Signs:  Height (cm): 177.8 (02-07 @ 18:51)  Weight (kg): 158.8 (02-07 @ 18:51)  BMI (kg/m2): 50.2 (02-07 @ 18:51)  BSA (m2): 2.65 (02-07 @ 18:51)  Vital Signs Last 24 Hrs  T(C): 36.8 (08 Feb 2020 05:25), Max: 37.1 (08 Feb 2020 00:05)  T(F): 98.3 (08 Feb 2020 05:25), Max: 98.7 (08 Feb 2020 00:05)  HR: 91 (08 Feb 2020 05:25) (88 - 100)  BP: 109/63 (08 Feb 2020 05:25) (109/63 - 147/85)  BP(mean): 102 (07 Feb 2020 19:09) (102 - 102)  RR: 16 (08 Feb 2020 05:25) (16 - 18)  SpO2: 97% (08 Feb 2020 05:25) (97% - 100%)  I&O's Summary      Constitutional: NAD, awake and alert, well-developed  Neurological: AAO x 3, no focal deficits  HEENT: PERRLA, EOMI, MMM  Neck: Soft and supple, No LAD, No JVD  Respiratory: Breath sounds are clear bilaterally, No wheezing, rales or rhonchi  Cardiovascular: S1 and S2, regular rate and rhythm; no Murmurs, gallops or rubs  Gastrointestinal: Bowel Sounds present, soft, nontender, nondistended, no guarding, no rebound tenderness  Back: No CVA tenderness   Extremities: No peripheral edema  Vascular: 2+ peripheral pulses  Musculoskeletal: 5/5 strength b/l upper and lower extremities  Skin: No rashes  Breast: Deferred  Rectal: Deferred    MEDICATIONS  (STANDING):  dextrose 50% Injectable 12.5 Gram(s) IV Push once  dextrose 50% Injectable 25 Gram(s) IV Push once  dextrose 50% Injectable 25 Gram(s) IV Push once  influenza   Vaccine 0.5 milliLiter(s) IntraMuscular once  insulin lispro (HumaLOG) corrective regimen sliding scale   SubCutaneous three times a day before meals  insulin lispro (HumaLOG) corrective regimen sliding scale   SubCutaneous at bedtime  piperacillin/tazobactam IVPB.. 3.375 Gram(s) IV Intermittent every 8 hours  vancomycin  IVPB 1000 milliGRAM(s) IV Intermittent every 12 hours      LABS: All Labs Reviewed:                        14.6   14.47 )-----------( 395      ( 07 Feb 2020 19:58 )             44.3     02-07    132<L>  |  101  |  11  ----------------------------<  372<H>  4.1   |  25  |  1.06    Ca    8.9      07 Feb 2020 19:58    TPro  8.1  /  Alb  3.6  /  TBili  0.4  /  DBili  x   /  AST  12<L>  /  ALT  35  /  AlkPhos  106  02-07    PT/INR - ( 07 Feb 2020 19:58 )   PT: 11.9 sec;   INR: 1.07 ratio         PTT - ( 07 Feb 2020 19:58 )  PTT:36.3 sec      RADIOLOGY/EKG:    < from: CT Abdomen and Pelvis No Cont (02.04.20 @ 23:17) >    IMPRESSION:    Subcutaneous fat stranding in the upper left leg with scattered bilateral inguinal nodes may be post traumatic or due to cellulitis. Correlate clinically.    Small right greater than left fat-containing inguinal hernias. No soft tissue mass.    < end of copied text >    < from: CT Abdomen and Pelvis No Cont (02.04.20 @ 23:17) >    IMPRESSION:    Subcutaneous fat stranding in the upper left leg with scattered bilateral inguinal nodes may be post traumatic or due to cellulitis. Correlate clinically.    Small right greater than left fat-containing inguinal hernias. No soft tissue mass.          < end of copied text > HOSPITALIST PROGRESS NOTE:  SUBJECTIVE:  PCP:  Chief Complaint: Patient is a 39y old  Male who presents with a chief complaint of cellulitis (07 Feb 2020 22:44)      HPI:  38 yo M with no pertinent PMH presents with left upper thigh pain and swelling. His symptoms originally began 4 days ago with swelling and redness. He was seen in the ED the day after and was diagnosed with cellulitis and prescribed a 10 day course of BID Keflex and BID Bactrim. His swelling never truly improved, but today, his pain and redness worsened, along with a "black spot" that developed, making him concerned for necrosis. He denies fevers, chills, nausea, vomiting, abdominal pain, rash, or swelling anywhere else, diarrhea, constipation, blood in stool, dysuria, hematuria, urinary discharge, or problems with sex. He does note a previous ingrown hair at that site that he used to pick at in the past. No trauma to that site. No known personal history of diabetes (mother had diabetes). No weight changes.  In the ED, he was given Vancomycin 2g IV x1, Zosyn 3.375g IV x1, and NS 1L x2. (07 Feb 2020 22:44)    2/8:  Above reviewed; patient has no complaints.  REdness and pain still present in LLE    Allergies:  No Known Allergies    REVIEW OF SYSTEMS:  See HPI. All other review of systems is negative unless indicated above.     OBJECTIVE  Physical Exam:  Vital Signs Last 24 Hrs  T(C): 36.8 (08 Feb 2020 11:10), Max: 37.1 (08 Feb 2020 00:05)  T(F): 98.2 (08 Feb 2020 11:10), Max: 98.7 (08 Feb 2020 00:05)  HR: 87 (08 Feb 2020 11:10) (87 - 100)  BP: 142/86 (08 Feb 2020 11:10) (109/63 - 147/85)  BP(mean): 102 (07 Feb 2020 19:09) (102 - 102)  RR: 16 (08 Feb 2020 11:10) (16 - 18)  SpO2: 97% (08 Feb 2020 11:10) (97% - 100%)      Constitutional: NAD, awake and alert, well-developed  Neurological: AAO x 3, no focal deficits  HEENT: PERRLA, EOMI, MMM  Neck: Soft and supple, No LAD, No JVD  Respiratory: Breath sounds are clear bilaterally, No wheezing, rales or rhonchi  Cardiovascular: S1 and S2, regular rate and rhythm; no Murmurs, gallops or rubs  Gastrointestinal: Bowel Sounds present, soft, nontender, nondistended, no guarding, no rebound tenderness  Back: No CVA tenderness   Extremities: No peripheral edema; Left inner medial + redness, induration, tenderness, warmth; broken blister thigh   Vascular: 2+ peripheral pulses  Musculoskeletal: 5/5 strength b/l upper and lower extremities  Skin: No rashes  Breast: Deferred  Rectal: Deferred    MEDICATIONS  (STANDING):  dextrose 50% Injectable 12.5 Gram(s) IV Push once  dextrose 50% Injectable 25 Gram(s) IV Push once  dextrose 50% Injectable 25 Gram(s) IV Push once  influenza   Vaccine 0.5 milliLiter(s) IntraMuscular once  insulin lispro (HumaLOG) corrective regimen sliding scale   SubCutaneous three times a day before meals  insulin lispro (HumaLOG) corrective regimen sliding scale   SubCutaneous at bedtime  piperacillin/tazobactam IVPB.. 3.375 Gram(s) IV Intermittent every 8 hours  vancomycin  IVPB 1000 milliGRAM(s) IV Intermittent every 12 hours      LABS: All Labs Reviewed:                        14.6   14.47 )-----------( 395      ( 07 Feb 2020 19:58 )             44.3     02-07    132<L>  |  101  |  11  ----------------------------<  372<H>  4.1   |  25  |  1.06    Ca    8.9      07 Feb 2020 19:58    TPro  8.1  /  Alb  3.6  /  TBili  0.4  /  DBili  x   /  AST  12<L>  /  ALT  35  /  AlkPhos  106  02-07    PT/INR - ( 07 Feb 2020 19:58 )   PT: 11.9 sec;   INR: 1.07 ratio         PTT - ( 07 Feb 2020 19:58 )  PTT:36.3 sec      RADIOLOGY/EKG:    < from: CT Abdomen and Pelvis No Cont (02.04.20 @ 23:17) >    IMPRESSION:    Subcutaneous fat stranding in the upper left leg with scattered bilateral inguinal nodes may be post traumatic or due to cellulitis. Correlate clinically.    Small right greater than left fat-containing inguinal hernias. No soft tissue mass.    < end of copied text >    < from: CT Abdomen and Pelvis No Cont (02.04.20 @ 23:17) >    IMPRESSION:    Subcutaneous fat stranding in the upper left leg with scattered bilateral inguinal nodes may be post traumatic or due to cellulitis. Correlate clinically.    Small right greater than left fat-containing inguinal hernias. No soft tissue mass.          < end of copied text >

## 2020-02-08 NOTE — PROGRESS NOTE ADULT - ASSESSMENT
40 yo M with no pertinent PMH presents with left upper thigh pain and swelling, found to be due to cellulitis.    #Sepsis 2ndry to Cellulitis of left thigh  - Meets sepsis criteria with leukocytosis and tachycardia on admission  - Upper thigh, with pain, failed outpatient PO antibiotic therapy  - No abscess seen on CT, no concern for necrotizing fasciitis at this time  - With mild lactic acidosis, s/p 2L IVFs  - repeat lactate WNL  - continue Vanc/Zosyn  - F/u blood cx  - ID consult    #Lactic acidosis  resolved   - Mild, 2.1 , repeat WNL  - s/p 2L IVFs  - Cellulitis workup otherwise noted    #Hyperglycemia  -  on admission  - Patient acknowledged he drank a soda right before ED arrival, but still suspect patient has undiagnosed diabetes  - Low carb diet  - Check FS QAC + HS with low dose SSI to avoid DKA  - Check A1C and lipid panel in AM    #Prophylactic measure  - DVT PPX: IMPROVE score of 0, low risk, no pharmacological PPX needed, will give SCDs 38 yo M with no pertinent PMH presents with left upper thigh pain and swelling, found to be due to cellulitis.    #Sepsis 2ndry to Cellulitis of left thigh  - Meets sepsis criteria with leukocytosis and tachycardia on admission  - Upper thigh, with pain, failed outpatient PO antibiotic therapy  - No abscess seen on CT, no concern for necrotizing fasciitis at this time  - With mild lactic acidosis, s/p 2L IVFs  - repeat lactate WNL  - continue Vanc/Rocephin   - F/u blood cx  - ID consult appreciated     #Lactic acidosis  resolved   - Mild, 2.1 , repeat WNL  - s/p 2L IVFs  - Cellulitis workup otherwise noted    #Hyperglycemia  -  on admission  - Patient acknowledged he drank a soda right before ED arrival, but still suspect patient has undiagnosed diabetes  - Low carb diet  - Check FS QAC + HS, Continue ISS  - Check A1C and lipid panel in AM    #Prophylactic measure  - DVT PPX: IMPROVE score of 0, low risk, no pharmacological PPX needed, will give SCDs

## 2020-02-09 LAB
HBA1C BLD-MCNC: 10.7 % — HIGH (ref 4–5.6)
HCT VFR BLD CALC: 43.5 % — SIGNIFICANT CHANGE UP (ref 39–50)
HGB BLD-MCNC: 14.8 G/DL — SIGNIFICANT CHANGE UP (ref 13–17)
MCHC RBC-ENTMCNC: 30.9 PG — SIGNIFICANT CHANGE UP (ref 27–34)
MCHC RBC-ENTMCNC: 34 GM/DL — SIGNIFICANT CHANGE UP (ref 32–36)
MCV RBC AUTO: 90.8 FL — SIGNIFICANT CHANGE UP (ref 80–100)
PLATELET # BLD AUTO: 382 K/UL — SIGNIFICANT CHANGE UP (ref 150–400)
RBC # BLD: 4.79 M/UL — SIGNIFICANT CHANGE UP (ref 4.2–5.8)
RBC # FLD: 11.9 % — SIGNIFICANT CHANGE UP (ref 10.3–14.5)
VANCOMYCIN TROUGH SERPL-MCNC: 3.6 UG/ML — LOW (ref 10–20)
WBC # BLD: 12.53 K/UL — HIGH (ref 3.8–10.5)
WBC # FLD AUTO: 12.53 K/UL — HIGH (ref 3.8–10.5)

## 2020-02-09 PROCEDURE — 99232 SBSQ HOSP IP/OBS MODERATE 35: CPT

## 2020-02-09 RX ORDER — VANCOMYCIN HCL 1 G
1000 VIAL (EA) INTRAVENOUS EVERY 8 HOURS
Refills: 0 | Status: DISCONTINUED | OUTPATIENT
Start: 2020-02-09 | End: 2020-02-13

## 2020-02-09 RX ADMIN — Medication 250 MILLIGRAM(S): at 21:15

## 2020-02-09 RX ADMIN — Medication 4: at 08:50

## 2020-02-09 RX ADMIN — Medication 250 MILLIGRAM(S): at 14:38

## 2020-02-09 RX ADMIN — Medication 600 MILLIGRAM(S): at 17:48

## 2020-02-09 RX ADMIN — Medication 166.67 MILLIGRAM(S): at 06:17

## 2020-02-09 RX ADMIN — Medication 600 MILLIGRAM(S): at 17:11

## 2020-02-09 RX ADMIN — Medication 2: at 17:31

## 2020-02-09 RX ADMIN — CEFTRIAXONE 2000 MILLIGRAM(S): 500 INJECTION, POWDER, FOR SOLUTION INTRAMUSCULAR; INTRAVENOUS at 11:57

## 2020-02-09 RX ADMIN — Medication 4: at 11:58

## 2020-02-09 NOTE — PROGRESS NOTE ADULT - ASSESSMENT
38 y/o Male with h/o obesity was admitted on 2/7 with left upper thigh pain and swelling. His symptoms originally began 4 days PTA with swelling and redness. He was seen in the ED the day after and was diagnosed with cellulitis and prescribed BID Keflex and BID Bactrim. His swelling never truly improved, but today, his pain and redness worsened, along with a "black spot" that developed, making him concerned for necrosis. He denies fevers, chills at home. He does note a previous ingrown hair at that site that he used to pick at in the past. No trauma to that site. In ER he received vancomycin IV and zosyn.     1. Left LE cellulitis. Possible early abscess. Possible MRSA infection. Obesity.   -leukocytosis improving  -obtain BC x 2 are negative to date  -would culture collected  -on vancomycin 1250 mg IV q12h and ceftriaxone 2 gm IV qd # 2  -tolerating abx well so far; no side effects noted  - vancomycin trough level is low; increase vancomycin to 1 gm IV q8h  -local wound care  -elevate leg  -monitor temps  -f/u CBC  -supportive care  2. Other issues:   -care per medicine

## 2020-02-09 NOTE — PROGRESS NOTE ADULT - SUBJECTIVE AND OBJECTIVE BOX
Date of service: 02-09-20 @ 10:43    Has left upper thigh pain  Swelling and induration feels less  Vancomycin level reported low    ROS: no fever or chills; denies dizziness, no HA, no SOB or cough, no abdominal pain, no diarrhea or constipation; no dysuria    MEDICATIONS  (STANDING):  cefTRIAXone Injectable. 2000 milliGRAM(s) IV Push every 24 hours  dextrose 5%. 1000 milliLiter(s) (50 mL/Hr) IV Continuous <Continuous>  dextrose 50% Injectable 12.5 Gram(s) IV Push once  dextrose 50% Injectable 25 Gram(s) IV Push once  dextrose 50% Injectable 25 Gram(s) IV Push once  influenza   Vaccine 0.5 milliLiter(s) IntraMuscular once  insulin lispro (HumaLOG) corrective regimen sliding scale   SubCutaneous three times a day before meals  insulin lispro (HumaLOG) corrective regimen sliding scale   SubCutaneous at bedtime  vancomycin  IVPB 1000 milliGRAM(s) IV Intermittent every 8 hours      Vital Signs Last 24 Hrs  T(C): 36.9 (09 Feb 2020 05:55), Max: 37.1 (08 Feb 2020 16:58)  T(F): 98.4 (09 Feb 2020 05:55), Max: 98.7 (08 Feb 2020 16:58)  HR: 84 (09 Feb 2020 05:55) (84 - 93)  BP: 112/52 (09 Feb 2020 05:55) (105/61 - 142/86)  BP(mean): --  RR: 18 (09 Feb 2020 05:55) (16 - 18)  SpO2: 97% (09 Feb 2020 05:55) (97% - 98%)    Physical Exam:      Constitutional:  No acute distress  HEENT: NC/AT, EOMI, PERRLA, conjunctivae clear  Neck: supple; thyroid not palpable  Back: no tenderness  Respiratory: respiratory effort normal; clear to auscultation  Cardiovascular: S1S2 regular, no murmurs  Abdomen: soft, not tender, not distended, positive BS  Genitourinary: no suprapubic tenderness  Lymphatic: no LN palpable  Musculoskeletal: no muscle tenderness, no joint swelling or tenderness  Extremities: no pedal edema  Left medial thigh area or redness, induration, tenderness, warmth; broken blister noted with some superficial necrosis; scant drainage; no fluctuence - slightly better  Neurological/ Psychiatric: AxOx3, judgement and insight normal; moving all extremities  Skin: no rashes; no palpable lesions    Labs: reviewed                        14.8   12.53 )-----------( 382      ( 09 Feb 2020 06:02 )             43.5     02-08    134<L>  |  103  |  9   ----------------------------<  244<H>  3.9   |  25  |  0.80    Ca    8.3<L>      08 Feb 2020 09:15  Phos  2.9     02-08  Mg     1.9     02-08    TPro  8.1  /  Alb  3.6  /  TBili  0.4  /  DBili  x   /  AST  12<L>  /  ALT  35  /  AlkPhos  106  02-07    Vancomycin Level, Trough: 3.6 ug/mL (02-09 @ 06:02)                          14.6   14.47 )-----------( 395      ( 07 Feb 2020 19:58 )             44.3     02-07    132<L>  |  101  |  11  ----------------------------<  372<H>  4.1   |  25  |  1.06    Ca    8.9      07 Feb 2020 19:58    TPro  8.1  /  Alb  3.6  /  TBili  0.4  /  DBili  x   /  AST  12<L>  /  ALT  35  /  AlkPhos  106  02-07     LIVER FUNCTIONS - ( 07 Feb 2020 19:58 )  Alb: 3.6 g/dL / Pro: 8.1 gm/dL / ALK PHOS: 106 U/L / ALT: 35 U/L / AST: 12 U/L / GGT: x             Culture - Blood (collected 07 Feb 2020 19:58)  Source: .Blood None  Preliminary Report (09 Feb 2020 02:02):    No growth to date.    Culture - Blood (collected 07 Feb 2020 19:58)  Source: .Blood None  Preliminary Report (09 Feb 2020 02:02):    No growth to date.          Radiology: all available radiological tests reviewed    Advanced directives addressed: full resuscitation

## 2020-02-09 NOTE — PROGRESS NOTE ADULT - ASSESSMENT
40 yo M with no pertinent PMH presents with left upper thigh pain and swelling, found to be due to cellulitis.    #Sepsis 2ndry to Cellulitis of left thigh  - Meets sepsis criteria with leukocytosis and tachycardia on admission  - Upper thigh, with pain, failed outpatient PO antibiotic therapy  - No abscess seen on CT, no concern for necrotizing fasciitis at this time  - Blood Cx NGTD  - With mild lactic acidosis, s/p 2L IVFs  - repeat lactate WNL  - continue Vanc/Rocephin #2  - ID consult appreciated     #Lactic acidosis  resolved   - Mild, 2.1 , repeat WNL  - s/p 2L IVFs  - Cellulitis workup otherwise noted    #Hyperglycemia 2ndry to New onset of DM  - A1C 10.5  - Check FS QAC + HS, Continue ISS  - FU lipid panel in AM  - Nutrition consult  - Will need to be D/C on Metformin     #Prophylactic measure  - DVT PPX: IMPROVE score of 0, low risk, no pharmacological PPX needed, will give SCDs    Dispo - patient here with Left thigh cellulitis margarita prob need 2-3 days of IV ABX FU with ID; Also uncontrolled sugars due to new onset of DM; Started on ISS  need to be discharged on Metformin, Glucometer etc

## 2020-02-09 NOTE — PROGRESS NOTE ADULT - SUBJECTIVE AND OBJECTIVE BOX
HOSPITALIST PROGRESS NOTE:  SUBJECTIVE:  PCP:  Chief Complaint: Patient is a 39y old  Male who presents with a chief complaint of cellulitis (07 Feb 2020 22:44)      HPI:  38 yo M with no pertinent PMH presents with left upper thigh pain and swelling. His symptoms originally began 4 days ago with swelling and redness. He was seen in the ED the day after and was diagnosed with cellulitis and prescribed a 10 day course of BID Keflex and BID Bactrim. His swelling never truly improved, but today, his pain and redness worsened, along with a "black spot" that developed, making him concerned for necrosis. He denies fevers, chills, nausea, vomiting, abdominal pain, rash, or swelling anywhere else, diarrhea, constipation, blood in stool, dysuria, hematuria, urinary discharge, or problems with sex. He does note a previous ingrown hair at that site that he used to pick at in the past. No trauma to that site. No known personal history of diabetes (mother had diabetes). No weight changes.  In the ED, he was given Vancomycin 2g IV x1, Zosyn 3.375g IV x1, and NS 1L x2. (07 Feb 2020 22:44)    2/8:  Above reviewed; patient has no complaints.  REdness and pain still present in LLE  2/9:  redness in LLE improving; larat denies any hx of DM; counseled on diet, exercise and the need to be on meds on d/C    Allergies:  No Known Allergies    REVIEW OF SYSTEMS:  See HPI. All other review of systems is negative unless indicated above.     OBJECTIVE  Physical Exam:  Vital Signs Last 24 Hrs  T(C): 36.9 (09 Feb 2020 11:45), Max: 37.1 (08 Feb 2020 16:58)  T(F): 98.4 (09 Feb 2020 11:45), Max: 98.7 (08 Feb 2020 16:58)  HR: 81 (09 Feb 2020 11:45) (81 - 93)  BP: 143/78 (09 Feb 2020 11:45) (105/61 - 143/78)  BP(mean): --  RR: 18 (09 Feb 2020 11:45) (18 - 18)  SpO2: 97% (09 Feb 2020 11:45) (97% - 98%)    Constitutional: NAD, awake and alert, well-developed  Neurological: AAO x 3, no focal deficits  HEENT: PERRLA, EOMI, MMM  Neck: Soft and supple, No LAD, No JVD  Respiratory: Breath sounds are clear bilaterally, No wheezing, rales or rhonchi  Cardiovascular: S1 and S2, regular rate and rhythm; no Murmurs, gallops or rubs  Gastrointestinal: Bowel Sounds present, soft, nontender, nondistended, no guarding, no rebound tenderness  Back: No CVA tenderness   Extremities: No peripheral edema; Left inner medial + redness, induration, tenderness, warmth; broken blister thigh   Vascular: 2+ peripheral pulses  Musculoskeletal: 5/5 strength b/l upper and lower extremities  Skin: No rashes  Breast: Deferred  Rectal: Deferred    MEDICATIONS  (STANDING):  dextrose 50% Injectable 12.5 Gram(s) IV Push once  dextrose 50% Injectable 25 Gram(s) IV Push once  dextrose 50% Injectable 25 Gram(s) IV Push once  influenza   Vaccine 0.5 milliLiter(s) IntraMuscular once  insulin lispro (HumaLOG) corrective regimen sliding scale   SubCutaneous three times a day before meals  insulin lispro (HumaLOG) corrective regimen sliding scale   SubCutaneous at bedtime  piperacillin/tazobactam IVPB.. 3.375 Gram(s) IV Intermittent every 8 hours  vancomycin  IVPB 1000 milliGRAM(s) IV Intermittent every 12 hours    Lab Results:  CBC  CBC Full  -  ( 09 Feb 2020 06:02 )  WBC Count : 12.53 K/uL  RBC Count : 4.79 M/uL  Hemoglobin : 14.8 g/dL  Hematocrit : 43.5 %  Platelet Count - Automated : 382 K/uL  Mean Cell Volume : 90.8 fl  Mean Cell Hemoglobin : 30.9 pg  Mean Cell Hemoglobin Concentration : 34.0 gm/dL  Auto Neutrophil # : x  Auto Lymphocyte # : x  Auto Monocyte # : x  Auto Eosinophil # : x  Auto Basophil # : x  Auto Neutrophil % : x  Auto Lymphocyte % : x  Auto Monocyte % : x  Auto Eosinophil % : x  Auto Basophil % : x    .		Differential:	[] Automated		[] Manual  Chemistry                        14.8   12.53 )-----------( 382      ( 09 Feb 2020 06:02 )             43.5     02-08    134<L>  |  103  |  9   ----------------------------<  244<H>  3.9   |  25  |  0.80    Ca    8.3<L>      08 Feb 2020 09:15  Phos  2.9     02-08  Mg     1.9     02-08    TPro  8.1  /  Alb  3.6  /  TBili  0.4  /  DBili  x   /  AST  12<L>  /  ALT  35  /  AlkPhos  106  02-07    LIVER FUNCTIONS - ( 07 Feb 2020 19:58 )  Alb: 3.6 g/dL / Pro: 8.1 gm/dL / ALK PHOS: 106 U/L / ALT: 35 U/L / AST: 12 U/L / GGT: x           PT/INR - ( 07 Feb 2020 19:58 )   PT: 11.9 sec;   INR: 1.07 ratio      PTT - ( 07 Feb 2020 19:58 )  PTT:36.3 sec    MICROBIOLOGY/CULTURES:  Culture Results:   No growth (02-08 @ 09:35)  Culture Results:   No growth to date. (02-07 @ 19:58)  Culture Results:   No growth to date. (02-07 @ 19:58)      RADIOLOGY/EKG:    < from: CT Abdomen and Pelvis No Cont (02.04.20 @ 23:17) >    IMPRESSION:    Subcutaneous fat stranding in the upper left leg with scattered bilateral inguinal nodes may be post traumatic or due to cellulitis. Correlate clinically.    Small right greater than left fat-containing inguinal hernias. No soft tissue mass.    < end of copied text >    < from: CT Abdomen and Pelvis No Cont (02.04.20 @ 23:17) >    IMPRESSION:    Subcutaneous fat stranding in the upper left leg with scattered bilateral inguinal nodes may be post traumatic or due to cellulitis. Correlate clinically.    Small right greater than left fat-containing inguinal hernias. No soft tissue mass.          < end of copied text >

## 2020-02-10 LAB
ANION GAP SERPL CALC-SCNC: 7 MMOL/L — SIGNIFICANT CHANGE UP (ref 5–17)
BUN SERPL-MCNC: 13 MG/DL — SIGNIFICANT CHANGE UP (ref 7–23)
CALCIUM SERPL-MCNC: 8.7 MG/DL — SIGNIFICANT CHANGE UP (ref 8.5–10.1)
CHLORIDE SERPL-SCNC: 103 MMOL/L — SIGNIFICANT CHANGE UP (ref 96–108)
CO2 SERPL-SCNC: 26 MMOL/L — SIGNIFICANT CHANGE UP (ref 22–31)
CREAT SERPL-MCNC: 0.7 MG/DL — SIGNIFICANT CHANGE UP (ref 0.5–1.3)
CULTURE RESULTS: SIGNIFICANT CHANGE UP
GLUCOSE SERPL-MCNC: 187 MG/DL — HIGH (ref 70–99)
HCT VFR BLD CALC: 41.1 % — SIGNIFICANT CHANGE UP (ref 39–50)
HGB BLD-MCNC: 14.3 G/DL — SIGNIFICANT CHANGE UP (ref 13–17)
MAGNESIUM SERPL-MCNC: 1.9 MG/DL — SIGNIFICANT CHANGE UP (ref 1.6–2.6)
MCHC RBC-ENTMCNC: 31.2 PG — SIGNIFICANT CHANGE UP (ref 27–34)
MCHC RBC-ENTMCNC: 34.8 GM/DL — SIGNIFICANT CHANGE UP (ref 32–36)
MCV RBC AUTO: 89.5 FL — SIGNIFICANT CHANGE UP (ref 80–100)
PHOSPHATE SERPL-MCNC: 3.5 MG/DL — SIGNIFICANT CHANGE UP (ref 2.5–4.5)
PLATELET # BLD AUTO: 355 K/UL — SIGNIFICANT CHANGE UP (ref 150–400)
POTASSIUM SERPL-MCNC: 3.8 MMOL/L — SIGNIFICANT CHANGE UP (ref 3.5–5.3)
POTASSIUM SERPL-SCNC: 3.8 MMOL/L — SIGNIFICANT CHANGE UP (ref 3.5–5.3)
RBC # BLD: 4.59 M/UL — SIGNIFICANT CHANGE UP (ref 4.2–5.8)
RBC # FLD: 11.9 % — SIGNIFICANT CHANGE UP (ref 10.3–14.5)
SODIUM SERPL-SCNC: 136 MMOL/L — SIGNIFICANT CHANGE UP (ref 135–145)
SPECIMEN SOURCE: SIGNIFICANT CHANGE UP
VANCOMYCIN TROUGH SERPL-MCNC: 8.4 UG/ML — LOW (ref 10–20)
WBC # BLD: 12.1 K/UL — HIGH (ref 3.8–10.5)
WBC # FLD AUTO: 12.1 K/UL — HIGH (ref 3.8–10.5)

## 2020-02-10 PROCEDURE — 99232 SBSQ HOSP IP/OBS MODERATE 35: CPT

## 2020-02-10 RX ORDER — COLLAGENASE CLOSTRIDIUM HIST. 250 UNIT/G
1 OINTMENT (GRAM) TOPICAL DAILY
Refills: 0 | Status: DISCONTINUED | OUTPATIENT
Start: 2020-02-10 | End: 2020-02-13

## 2020-02-10 RX ADMIN — Medication 250 MILLIGRAM(S): at 05:52

## 2020-02-10 RX ADMIN — Medication 600 MILLIGRAM(S): at 12:19

## 2020-02-10 RX ADMIN — Medication 2: at 12:12

## 2020-02-10 RX ADMIN — Medication 2: at 17:16

## 2020-02-10 RX ADMIN — Medication 4: at 08:46

## 2020-02-10 RX ADMIN — Medication 250 MILLIGRAM(S): at 14:31

## 2020-02-10 RX ADMIN — Medication 600 MILLIGRAM(S): at 23:18

## 2020-02-10 RX ADMIN — Medication 250 MILLIGRAM(S): at 21:59

## 2020-02-10 RX ADMIN — Medication 1 APPLICATION(S): at 17:16

## 2020-02-10 RX ADMIN — CEFTRIAXONE 2000 MILLIGRAM(S): 500 INJECTION, POWDER, FOR SOLUTION INTRAMUSCULAR; INTRAVENOUS at 12:11

## 2020-02-10 NOTE — DIETITIAN INITIAL EVALUATION ADULT. - OTHER INFO
38yo male with no significant PMH p/w Lt upper thigh pain and swelling.  Pt admitted with cellulitis of Lt thigh with sepsis.  Pt with hyperglycemia 2/2 new onset DM (A1C of 10.7, to be d/c'd on metformin).  Provided pt with detailed DM diet education and outpatient dietitian contact information for further counseling in the community.  pt verbalized understanding.

## 2020-02-10 NOTE — DIETITIAN INITIAL EVALUATION ADULT. - ADD RECOMMEND
1) reinforce DM diet education and encourage therapeutic diet compliance 2) encourage pt to f/u with outpatient RD 3) monitor POC; adjust DM meds prn  4) encourage portion control

## 2020-02-10 NOTE — PROGRESS NOTE ADULT - SUBJECTIVE AND OBJECTIVE BOX
CHIEF COMPLAINT:    SUBJECTIVE:     REVIEW OF SYSTEMS:    CONSTITUTIONAL: No weakness, fevers or chills  EYES/ENT: No visual changes;  No vertigo or throat pain   NECK: No pain or stiffness  RESPIRATORY: No cough, wheezing, hemoptysis; No shortness of breath  CARDIOVASCULAR: No chest pain or palpitations  GASTROINTESTINAL: No abdominal or epigastric pain. No nausea, vomiting, or hematemesis; No diarrhea or constipation. No melena or hematochezia.  GENITOURINARY: No dysuria, frequency or hematuria  NEUROLOGICAL: No numbness or weakness  SKIN: No itching, burning, rashes, or lesions   All other review of systems is negative unless indicated above    Vital Signs Last 24 Hrs  T(C): 36.9 (10 Feb 2020 17:06), Max: 37.2 (09 Feb 2020 17:13)  T(F): 98.5 (10 Feb 2020 17:06), Max: 98.9 (09 Feb 2020 17:13)  HR: 95 (10 Feb 2020 17:06) (76 - 95)  BP: 133/74 (10 Feb 2020 10:54) (100/51 - 133/74)  BP(mean): --  RR: 18 (10 Feb 2020 17:06) (17 - 18)  SpO2: 98% (10 Feb 2020 17:06) (95% - 98%)    I&O's Summary    09 Feb 2020 07:01  -  10 Feb 2020 07:00  --------------------------------------------------------  IN: 250 mL / OUT: 0 mL / NET: 250 mL    10 Feb 2020 07:01  -  10 Feb 2020 17:07  --------------------------------------------------------  IN: 240 mL / OUT: 0 mL / NET: 240 mL        CAPILLARY BLOOD GLUCOSE      POCT Blood Glucose.: 183 mg/dL (10 Feb 2020 11:47)  POCT Blood Glucose.: 217 mg/dL (10 Feb 2020 08:34)  POCT Blood Glucose.: 163 mg/dL (09 Feb 2020 21:24)      PHYSICAL EXAM:    Constitutional: NAD, awake and alert, well-developed  HEENT: PERR, EOMI, Normal Hearing, MMM  Neck: Soft and supple, No LAD, No JVD  Respiratory: Breath sounds are clear bilaterally, No wheezing, rales or rhonchi  Cardiovascular: S1 and S2, regular rate and rhythm, no Murmurs, gallops or rubs  Gastrointestinal: Bowel Sounds present, soft, nontender, nondistended, no guarding, no rebound  Extremities: No peripheral edema  Vascular: 2+ peripheral pulses  Neurological: A/O x 3, no focal deficits  Musculoskeletal: 5/5 strength b/l upper and lower extremities  Skin: No rashes    MEDICATIONS:  MEDICATIONS  (STANDING):  cefTRIAXone Injectable. 2000 milliGRAM(s) IV Push every 24 hours  collagenase Ointment 1 Application(s) Topical daily  dextrose 5%. 1000 milliLiter(s) (50 mL/Hr) IV Continuous <Continuous>  dextrose 50% Injectable 12.5 Gram(s) IV Push once  dextrose 50% Injectable 25 Gram(s) IV Push once  dextrose 50% Injectable 25 Gram(s) IV Push once  influenza   Vaccine 0.5 milliLiter(s) IntraMuscular once  insulin lispro (HumaLOG) corrective regimen sliding scale   SubCutaneous three times a day before meals  insulin lispro (HumaLOG) corrective regimen sliding scale   SubCutaneous at bedtime  vancomycin  IVPB 1000 milliGRAM(s) IV Intermittent every 8 hours      LABS: All Labs Reviewed:                        14.3   12.10 )-----------( 355      ( 10 Feb 2020 04:51 )             41.1     02-10    136  |  103  |  13  ----------------------------<  187<H>  3.8   |  26  |  0.70    Ca    8.7      10 Feb 2020 04:51  Phos  3.5     02-10  Mg     1.9     02-10            Blood Culture: 02-08 @ 09:35  Organism --  Gram Stain Blood -- Gram Stain --  Specimen Source .Abscess left thigh  Culture-Blood --    02-07 @ 19:58  Organism --  Gram Stain Blood -- Gram Stain --  Specimen Source .Blood None  Culture-Blood --        Assessment and Plan:     38 yo M with no pertinent PMH presents with left upper thigh pain and swelling, found to be due to cellulitis.    #Sepsis 2ndry to Cellulitis of left thigh  - Meets sepsis criteria with leukocytosis and tachycardia on admission  - Upper thigh, with pain, failed outpatient PO antibiotic therapy  - No abscess seen on CT, no concern for necrotizing fasciitis at this time  - Blood Cx NGTD  - With mild lactic acidosis, s/p 2L IVFs  - repeat lactate WNL  - continue Vanc/Rocephin #2  - ID consult appreciated     #Lactic acidosis  resolved   - Mild, 2.1 , repeat WNL  - s/p 2L IVFs  - Cellulitis workup otherwise noted    #Hyperglycemia 2ndry to New onset of DM  - A1C 10.5  - Check FS QAC + HS, Continue ISS  - FU lipid panel in AM  - Nutrition consult  - Will need to be D/C on Metformin     #Prophylactic measure  - DVT PPX: IMPROVE score of 0, low risk, no pharmacological PPX needed, will give SCDs

## 2020-02-10 NOTE — PROVIDER CONTACT NOTE (OTHER) - SITUATION
DR. LAVON GREEN, SPOKE WITH AMBER FROM MD'S OFFICE. SHE WILL INFORM PCP, PATIENT IS ADMITTED TO . PLEASE FAX DISCHARGE PROVIDER NOTE AND SUMMARY -[phone]

## 2020-02-10 NOTE — PROGRESS NOTE ADULT - ASSESSMENT
40 y/o Male with h/o obesity was admitted on 2/7 with left upper thigh pain and swelling. His symptoms originally began 4 days PTA with swelling and redness. He was seen in the ED the day after and was diagnosed with cellulitis and prescribed BID Keflex and BID Bactrim. His swelling never truly improved, but today, his pain and redness worsened, along with a "black spot" that developed, making him concerned for necrosis. He denies fevers, chills at home. He does note a previous ingrown hair at that site that he used to pick at in the past. No trauma to that site. In ER he received vancomycin IV and zosyn.     1. Left LE cellulitis. Possible early abscess. Possible MRSA infection. Obesity.   -leukocytosis improving  -obtain BC x 2 are negative to date  -would culture negative to date  -on vancomycin 1000 mg IV q8h and ceftriaxone 2 gm IV qd # 3  -tolerating abx well so far; no side effects noted  -continue abx coverage  -local wound care  -elevate leg  -monitor temps  -f/u CBC  -supportive care  2. Other issues:   -care per medicine

## 2020-02-10 NOTE — DIETITIAN INITIAL EVALUATION ADULT. - PERTINENT MEDS FT
MEDICATIONS  (STANDING):  cefTRIAXone Injectable. 2000 milliGRAM(s) IV Push every 24 hours  dextrose 5%. 1000 milliLiter(s) (50 mL/Hr) IV Continuous <Continuous>  dextrose 50% Injectable 12.5 Gram(s) IV Push once  dextrose 50% Injectable 25 Gram(s) IV Push once  dextrose 50% Injectable 25 Gram(s) IV Push once  influenza   Vaccine 0.5 milliLiter(s) IntraMuscular once  insulin lispro (HumaLOG) corrective regimen sliding scale   SubCutaneous three times a day before meals  insulin lispro (HumaLOG) corrective regimen sliding scale   SubCutaneous at bedtime  vancomycin  IVPB 1000 milliGRAM(s) IV Intermittent every 8 hours    MEDICATIONS  (PRN):  acetaminophen   Tablet .. 650 milliGRAM(s) Oral every 6 hours PRN Mild Pain (1 - 3)  dextrose 40% Gel 15 Gram(s) Oral once PRN Blood Glucose LESS THAN 70 milliGRAM(s)/deciliter  glucagon  Injectable 1 milliGRAM(s) IntraMuscular once PRN Glucose LESS THAN 70 milligrams/deciliter  ibuprofen  Tablet. 600 milliGRAM(s) Oral every 6 hours PRN Mild Pain (1 - 3)  ondansetron Injectable 4 milliGRAM(s) IV Push every 6 hours PRN Nausea and/or Vomiting

## 2020-02-10 NOTE — DIETITIAN INITIAL EVALUATION ADULT. - PERTINENT LABORATORY DATA
02-10 Na136 mmol/L Glu 187 mg/dL<H> K+ 3.8 mmol/L Cr  0.70 mg/dL BUN 13 mg/dL Phos 3.5 mg/dL Alb n/a   PAB n/a       CAPILLARY BLOOD GLUCOSE      POCT Blood Glucose.: 217 mg/dL (10 Feb 2020 08:34)  POCT Blood Glucose.: 163 mg/dL (09 Feb 2020 21:24)  POCT Blood Glucose.: 193 mg/dL (09 Feb 2020 16:39)  POCT Blood Glucose.: 211 mg/dL (09 Feb 2020 11:38)

## 2020-02-10 NOTE — DIETITIAN INITIAL EVALUATION ADULT. - NAME AND PHONE
Mary Montoya MA, RDN, CDN, [facility]  [phone] (office number)  [phone] (pager number)

## 2020-02-10 NOTE — PROGRESS NOTE ADULT - SUBJECTIVE AND OBJECTIVE BOX
Date of service: 02-10-20 @ 13:05    Lying in baed in NAD  Weak looking  Left upper thigh pain improving    ROS: no fever or chills; denies dizziness, no HA, no SOB or cough, no abdominal pain, no diarrhea or constipation; no dysuria, no legs pain, no rashes    MEDICATIONS  (STANDING):  cefTRIAXone Injectable. 2000 milliGRAM(s) IV Push every 24 hours  collagenase Ointment 1 Application(s) Topical daily  dextrose 5%. 1000 milliLiter(s) (50 mL/Hr) IV Continuous <Continuous>  dextrose 50% Injectable 12.5 Gram(s) IV Push once  dextrose 50% Injectable 25 Gram(s) IV Push once  dextrose 50% Injectable 25 Gram(s) IV Push once  influenza   Vaccine 0.5 milliLiter(s) IntraMuscular once  insulin lispro (HumaLOG) corrective regimen sliding scale   SubCutaneous three times a day before meals  insulin lispro (HumaLOG) corrective regimen sliding scale   SubCutaneous at bedtime  vancomycin  IVPB 1000 milliGRAM(s) IV Intermittent every 8 hours      Vital Signs Last 24 Hrs  T(C): 37.2 (10 Feb 2020 10:54), Max: 37.2 (09 Feb 2020 17:13)  T(F): 98.9 (10 Feb 2020 10:54), Max: 98.9 (09 Feb 2020 17:13)  HR: 91 (10 Feb 2020 10:54) (76 - 91)  BP: 133/74 (10 Feb 2020 10:54) (100/51 - 133/74)  BP(mean): --  RR: 17 (10 Feb 2020 10:54) (17 - 18)  SpO2: 95% (10 Feb 2020 10:54) (95% - 97%)    Physical Exam:      Constitutional:  No acute distress  HEENT: NC/AT, EOMI, PERRLA, conjunctivae clear  Neck: supple; thyroid not palpable  Back: no tenderness  Respiratory: respiratory effort normal; clear to auscultation  Cardiovascular: S1S2 regular, no murmurs  Abdomen: soft, not tender, not distended, positive BS  Genitourinary: no suprapubic tenderness  Lymphatic: no LN palpable  Musculoskeletal: no muscle tenderness, no joint swelling or tenderness  Extremities: no pedal edema  Left medial thigh area or redness, induration, tenderness, warmth; broken blister noted with some superficial necrosis; scant drainage; no fluctuence - slightly better  Neurological/ Psychiatric: AxOx3, judgement and insight normal; moving all extremities  Skin: no rashes; no palpable lesions    Labs: reviewed                        14.3   12.10 )-----------( 355      ( 10 Feb 2020 04:51 )             41.1     02-10    136  |  103  |  13  ----------------------------<  187<H>  3.8   |  26  |  0.70    Ca    8.7      10 Feb 2020 04:51  Phos  3.5     02-10  Mg     1.9     02-10    Vancomycin Level, Trough: 8.4 ug/mL (02-10 @ 04:51)  Vancomycin Level, Trough: 3.6 ug/mL (02-09 @ 06:02)                        14.8   12.53 )-----------( 382      ( 09 Feb 2020 06:02 )             43.5     02-08    134<L>  |  103  |  9   ----------------------------<  244<H>  3.9   |  25  |  0.80    Ca    8.3<L>      08 Feb 2020 09:15  Phos  2.9     02-08  Mg     1.9     02-08    TPro  8.1  /  Alb  3.6  /  TBili  0.4  /  DBili  x   /  AST  12<L>  /  ALT  35  /  AlkPhos  106  02-07    Vancomycin Level, Trough: 3.6 ug/mL (02-09 @ 06:02)                          14.6   14.47 )-----------( 395      ( 07 Feb 2020 19:58 )             44.3     02-07    132<L>  |  101  |  11  ----------------------------<  372<H>  4.1   |  25  |  1.06    Ca    8.9      07 Feb 2020 19:58    TPro  8.1  /  Alb  3.6  /  TBili  0.4  /  DBili  x   /  AST  12<L>  /  ALT  35  /  AlkPhos  106  02-07     LIVER FUNCTIONS - ( 07 Feb 2020 19:58 )  Alb: 3.6 g/dL / Pro: 8.1 gm/dL / ALK PHOS: 106 U/L / ALT: 35 U/L / AST: 12 U/L / GGT: x             Culture - Blood (collected 07 Feb 2020 19:58)  Source: .Blood None  Preliminary Report (09 Feb 2020 02:02):    No growth to date.    Culture - Blood (collected 07 Feb 2020 19:58)  Source: .Blood None  Preliminary Report (09 Feb 2020 02:02):    No growth to date.          Radiology: all available radiological tests reviewed    Advanced directives addressed: full resuscitation

## 2020-02-11 PROCEDURE — 99232 SBSQ HOSP IP/OBS MODERATE 35: CPT

## 2020-02-11 RX ORDER — NYSTATIN CREAM 100000 [USP'U]/G
1 CREAM TOPICAL THREE TIMES A DAY
Refills: 0 | Status: DISCONTINUED | OUTPATIENT
Start: 2020-02-11 | End: 2020-02-13

## 2020-02-11 RX ORDER — LACTOBACILLUS ACIDOPHILUS 100MM CELL
1 CAPSULE ORAL DAILY
Refills: 0 | Status: DISCONTINUED | OUTPATIENT
Start: 2020-02-11 | End: 2020-02-13

## 2020-02-11 RX ADMIN — Medication 600 MILLIGRAM(S): at 14:00

## 2020-02-11 RX ADMIN — NYSTATIN CREAM 1 APPLICATION(S): 100000 CREAM TOPICAL at 14:01

## 2020-02-11 RX ADMIN — CEFTRIAXONE 2000 MILLIGRAM(S): 500 INJECTION, POWDER, FOR SOLUTION INTRAMUSCULAR; INTRAVENOUS at 11:28

## 2020-02-11 RX ADMIN — Medication 250 MILLIGRAM(S): at 22:20

## 2020-02-11 RX ADMIN — Medication 1 APPLICATION(S): at 11:21

## 2020-02-11 RX ADMIN — Medication 2: at 14:00

## 2020-02-11 RX ADMIN — Medication 600 MILLIGRAM(S): at 00:01

## 2020-02-11 RX ADMIN — Medication 250 MILLIGRAM(S): at 15:15

## 2020-02-11 RX ADMIN — NYSTATIN CREAM 1 APPLICATION(S): 100000 CREAM TOPICAL at 22:18

## 2020-02-11 RX ADMIN — Medication 2: at 17:52

## 2020-02-11 RX ADMIN — Medication 600 MILLIGRAM(S): at 13:06

## 2020-02-11 RX ADMIN — Medication 250 MILLIGRAM(S): at 05:36

## 2020-02-11 RX ADMIN — Medication 1 TABLET(S): at 13:59

## 2020-02-11 RX ADMIN — Medication 2: at 10:12

## 2020-02-11 NOTE — PROGRESS NOTE ADULT - SUBJECTIVE AND OBJECTIVE BOX
Date of service: 02-11-20 @ 10:06    Lying in bed in NAD  Weak looking  Left upper thigh wound is open with scant drainage  Mild local pain  Still erythema    ROS: no fever or chills; denies dizziness, no HA, no SOB or cough, no abdominal pain, no diarrhea or constipation; no dysuria    MEDICATIONS  (STANDING):  cefTRIAXone Injectable. 2000 milliGRAM(s) IV Push every 24 hours  collagenase Ointment 1 Application(s) Topical daily  dextrose 5%. 1000 milliLiter(s) (50 mL/Hr) IV Continuous <Continuous>  dextrose 50% Injectable 12.5 Gram(s) IV Push once  dextrose 50% Injectable 25 Gram(s) IV Push once  dextrose 50% Injectable 25 Gram(s) IV Push once  influenza   Vaccine 0.5 milliLiter(s) IntraMuscular once  insulin lispro (HumaLOG) corrective regimen sliding scale   SubCutaneous three times a day before meals  insulin lispro (HumaLOG) corrective regimen sliding scale   SubCutaneous at bedtime  vancomycin  IVPB 1000 milliGRAM(s) IV Intermittent every 8 hours      Vital Signs Last 24 Hrs  T(C): 36.5 (11 Feb 2020 05:17), Max: 37.2 (10 Feb 2020 10:54)  T(F): 97.7 (11 Feb 2020 05:17), Max: 98.9 (10 Feb 2020 10:54)  HR: 74 (11 Feb 2020 05:17) (74 - 91)  BP: 97/55 (11 Feb 2020 05:17) (97/55 - 133/74)  BP(mean): --  RR: 18 (11 Feb 2020 05:17) (17 - 18)  SpO2: 96% (11 Feb 2020 05:17) (95% - 98%)    Physical Exam:      Constitutional:  No acute distress  HEENT: NC/AT, EOMI, PERRLA, conjunctivae clear  Neck: supple; thyroid not palpable  Back: no tenderness  Respiratory: respiratory effort normal; clear to auscultation  Cardiovascular: S1S2 regular, no murmurs  Abdomen: soft, not tender, not distended, positive BS  Genitourinary: no suprapubic tenderness  Lymphatic: no LN palpable  Musculoskeletal: no muscle tenderness, no joint swelling or tenderness  Extremities: no pedal edema  Left medial thigh area or redness, induration, tenderness, warmth; open wound with; scant drainage; - slightly better  Erythema improving  Neurological/ Psychiatric: AxOx3, moving all extremities  Skin: no rashes; no palpable lesions    Labs: reviewed                        14.3   12.10 )-----------( 355      ( 10 Feb 2020 04:51 )             41.1     02-10    136  |  103  |  13  ----------------------------<  187<H>  3.8   |  26  |  0.70    Ca    8.7      10 Feb 2020 04:51  Phos  3.5     02-10  Mg     1.9     02-10    Vancomycin Level, Trough: 8.4 ug/mL (02-10 @ 04:51)  Vancomycin Level, Trough: 3.6 ug/mL (02-09 @ 06:02)                        14.8   12.53 )-----------( 382      ( 09 Feb 2020 06:02 )             43.5     02-08    134<L>  |  103  |  9   ----------------------------<  244<H>  3.9   |  25  |  0.80    Ca    8.3<L>      08 Feb 2020 09:15  Phos  2.9     02-08  Mg     1.9     02-08    TPro  8.1  /  Alb  3.6  /  TBili  0.4  /  DBili  x   /  AST  12<L>  /  ALT  35  /  AlkPhos  106  02-07    Vancomycin Level, Trough: 3.6 ug/mL (02-09 @ 06:02)                          14.6   14.47 )-----------( 395      ( 07 Feb 2020 19:58 )             44.3     02-07    132<L>  |  101  |  11  ----------------------------<  372<H>  4.1   |  25  |  1.06    Ca    8.9      07 Feb 2020 19:58    TPro  8.1  /  Alb  3.6  /  TBili  0.4  /  DBili  x   /  AST  12<L>  /  ALT  35  /  AlkPhos  106  02-07     LIVER FUNCTIONS - ( 07 Feb 2020 19:58 )  Alb: 3.6 g/dL / Pro: 8.1 gm/dL / ALK PHOS: 106 U/L / ALT: 35 U/L / AST: 12 U/L / GGT: x           Culture - Abscess with Gram Stain (collected 08 Feb 2020 09:35)  Source: .Abscess left thigh  Final Report (10 Feb 2020 15:28):    Few Coag Negative Staphylococcus "Susceptibilities not performed"    Few Finegoldia magna "Susceptibilities not performed"    Rare Streptococcus agalactiae (Group B) Streptococcus agalactiae (Group    B) isolated    Culture - Blood (collected 07 Feb 2020 19:58)  Source: .Blood None  Preliminary Report (09 Feb 2020 02:02):    No growth to date.    Culture - Blood (collected 07 Feb 2020 19:58)  Source: .Blood None  Preliminary Report (09 Feb 2020 02:02):    No growth to date.        Radiology: all available radiological tests reviewed    Advanced directives addressed: full resuscitation

## 2020-02-11 NOTE — PROGRESS NOTE ADULT - ASSESSMENT
40 y/o Male with h/o obesity was admitted on 2/7 with left upper thigh pain and swelling. His symptoms originally began 4 days PTA with swelling and redness. He was seen in the ED the day after and was diagnosed with cellulitis and prescribed BID Keflex and BID Bactrim. His swelling never truly improved, but today, his pain and redness worsened, along with a "black spot" that developed, making him concerned for necrosis. He denies fevers, chills at home. He does note a previous ingrown hair at that site that he used to pick at in the past. No trauma to that site. In ER he received vancomycin IV and zosyn.     1. Left LE cellulitis with draining furuncle with strep group B, finegoldia magna, CNST. Obesity.   -leukocytosis improving  -obtain BC x 2 are negative to date  -would culture noted; no MRESA noted  -on vancomycin 1000 mg IV q8h and ceftriaxone 2 gm IV qd # 4  -tolerating abx well so far; no side effects noted  -continue abx coverage  -local wound care  -elevate leg  -monitor temps  -f/u CBC  -supportive care  2. Other issues:   -care per medicine

## 2020-02-12 LAB
ANION GAP SERPL CALC-SCNC: 7 MMOL/L — SIGNIFICANT CHANGE UP (ref 5–17)
BUN SERPL-MCNC: 11 MG/DL — SIGNIFICANT CHANGE UP (ref 7–23)
CALCIUM SERPL-MCNC: 9.1 MG/DL — SIGNIFICANT CHANGE UP (ref 8.5–10.1)
CHLORIDE SERPL-SCNC: 103 MMOL/L — SIGNIFICANT CHANGE UP (ref 96–108)
CO2 SERPL-SCNC: 26 MMOL/L — SIGNIFICANT CHANGE UP (ref 22–31)
CREAT SERPL-MCNC: 0.77 MG/DL — SIGNIFICANT CHANGE UP (ref 0.5–1.3)
GLUCOSE SERPL-MCNC: 204 MG/DL — HIGH (ref 70–99)
HCT VFR BLD CALC: 42.9 % — SIGNIFICANT CHANGE UP (ref 39–50)
HGB BLD-MCNC: 14.2 G/DL — SIGNIFICANT CHANGE UP (ref 13–17)
MCHC RBC-ENTMCNC: 29.8 PG — SIGNIFICANT CHANGE UP (ref 27–34)
MCHC RBC-ENTMCNC: 33.1 GM/DL — SIGNIFICANT CHANGE UP (ref 32–36)
MCV RBC AUTO: 90.1 FL — SIGNIFICANT CHANGE UP (ref 80–100)
PLATELET # BLD AUTO: 391 K/UL — SIGNIFICANT CHANGE UP (ref 150–400)
POTASSIUM SERPL-MCNC: 3.9 MMOL/L — SIGNIFICANT CHANGE UP (ref 3.5–5.3)
POTASSIUM SERPL-SCNC: 3.9 MMOL/L — SIGNIFICANT CHANGE UP (ref 3.5–5.3)
RBC # BLD: 4.76 M/UL — SIGNIFICANT CHANGE UP (ref 4.2–5.8)
RBC # FLD: 11.9 % — SIGNIFICANT CHANGE UP (ref 10.3–14.5)
SODIUM SERPL-SCNC: 136 MMOL/L — SIGNIFICANT CHANGE UP (ref 135–145)
WBC # BLD: 12.57 K/UL — HIGH (ref 3.8–10.5)
WBC # FLD AUTO: 12.57 K/UL — HIGH (ref 3.8–10.5)

## 2020-02-12 PROCEDURE — 99232 SBSQ HOSP IP/OBS MODERATE 35: CPT

## 2020-02-12 RX ADMIN — Medication 600 MILLIGRAM(S): at 17:39

## 2020-02-12 RX ADMIN — CEFTRIAXONE 2000 MILLIGRAM(S): 500 INJECTION, POWDER, FOR SOLUTION INTRAMUSCULAR; INTRAVENOUS at 12:42

## 2020-02-12 RX ADMIN — NYSTATIN CREAM 1 APPLICATION(S): 100000 CREAM TOPICAL at 21:08

## 2020-02-12 RX ADMIN — Medication 1 TABLET(S): at 12:42

## 2020-02-12 RX ADMIN — Medication 250 MILLIGRAM(S): at 05:44

## 2020-02-12 RX ADMIN — Medication 2: at 09:08

## 2020-02-12 RX ADMIN — Medication 250 MILLIGRAM(S): at 21:07

## 2020-02-12 RX ADMIN — Medication 600 MILLIGRAM(S): at 16:50

## 2020-02-12 RX ADMIN — NYSTATIN CREAM 1 APPLICATION(S): 100000 CREAM TOPICAL at 14:27

## 2020-02-12 RX ADMIN — Medication 250 MILLIGRAM(S): at 14:25

## 2020-02-12 RX ADMIN — NYSTATIN CREAM 1 APPLICATION(S): 100000 CREAM TOPICAL at 05:43

## 2020-02-12 RX ADMIN — Medication 1 APPLICATION(S): at 12:36

## 2020-02-12 NOTE — CONSULT NOTE ADULT - SUBJECTIVE AND OBJECTIVE BOX
Surgery was consulted to evaluate 39M for left groin self draining abscess.       Patient is a 39y old  Male who presents with a chief complaint of cellulitis     HPI:  38 y/o Male with h/o obesity was admitted on 2/7 with left upper thigh pain and swelling. His symptoms originally began 4 days PTA with swelling and redness. He was seen in the ED the day after and was diagnosed with cellulitis and prescribed BID Keflex and BID Bactrim. His swelling never truly improved, but today, his pain and redness worsened, along with a "black spot" that developed, making him concerned for necrosis. He denies fevers, chills at home. He does note a previous ingrown hair at that site that he used to pick at in the past. No trauma to that site. In ER he received vancomycin IV and zosyn.     PMH: as above  PSH: as above  Meds: per reconciliation sheet, noted below  MEDICATIONS  (STANDING):  dextrose 50% Injectable 12.5 Gram(s) IV Push once  dextrose 50% Injectable 25 Gram(s) IV Push once  dextrose 50% Injectable 25 Gram(s) IV Push once  influenza   Vaccine 0.5 milliLiter(s) IntraMuscular once  insulin lispro (HumaLOG) corrective regimen sliding scale   SubCutaneous three times a day before meals  insulin lispro (HumaLOG) corrective regimen sliding scale   SubCutaneous at bedtime  piperacillin/tazobactam IVPB.. 3.375 Gram(s) IV Intermittent every 8 hours  vancomycin  IVPB 1000 milliGRAM(s) IV Intermittent every 12 hours    MEDICATIONS  (PRN):  acetaminophen   Tablet .. 650 milliGRAM(s) Oral every 6 hours PRN Mild Pain (1 - 3)  dextrose 40% Gel 15 Gram(s) Oral once PRN Blood Glucose LESS THAN 70 milliGRAM(s)/deciliter  glucagon  Injectable 1 milliGRAM(s) IntraMuscular once PRN Glucose LESS THAN 70 milligrams/deciliter  ibuprofen  Tablet. 600 milliGRAM(s) Oral every 6 hours PRN Mild Pain (1 - 3)  ondansetron Injectable 4 milliGRAM(s) IV Push every 6 hours PRN Nausea and/or Vomiting    Allergies    No Known Allergies    Intolerances      Social: no smoking, no alcohol, no illegal drugs; no recent travel, no exposure to TB  FAMILY HISTORY:  FH: Parkinson's disease: father  FHx: heart disease: maternal grandfather  FH: diabetes mellitus: mother    no history of premature cardiovascular disease in first degree relatives    ROS: the patient denies fever, no chills, no HA, no seizures, no dizziness, no sore throat, no nasal congestion, no blurry vision, no CP, no palpitations, no SOB, no cough, no abdominal pain, no diarrhea, no N/V, no dysuria, has left thigh pain, no claudication, has left thigh rash, no joint aches, no rectal pain or bleeding, no night sweats  All other systems reviewed and are negative    ICU Vital Signs Last 24 Hrs  T(C): 36.4 (12 Feb 2020 11:41), Max: 37.2 (12 Feb 2020 05:29)  T(F): 97.6 (12 Feb 2020 11:41), Max: 99 (12 Feb 2020 05:29)  HR: 82 (12 Feb 2020 11:41) (77 - 92)  BP: 128/60 (12 Feb 2020 11:41) (103/50 - 128/60)  BP(mean): --  ABP: --  ABP(mean): --  RR: 18 (12 Feb 2020 11:41) (17 - 18)  SpO2: 97% (12 Feb 2020 11:41) (95% - 98%)      PE:    Constitutional:  No acute distress  HEENT: NC/AT, EOMI, PERRLA, conjunctivae clear; ears and nose atraumatic; pharynx benign  Neck: supple; thyroid not palpable  Back: no tenderness  Respiratory: respiratory effort normal; clear to auscultation  Cardiovascular: S1S2 regular, no murmurs  Abdomen: soft, not tender, not distended, positive BS; no liver or spleen organomegaly  Genitourinary: no suprapubic tenderness  Lymphatic: no LN palpable  Musculoskeletal: no muscle tenderness, no joint swelling or tenderness  Extremities: no pedal edema  Left medial thigh area or redness, induration, tenderness, warmth; broken blister noted with some superficial necrosis; scant drainage; no fluctuence  Neurological/ Psychiatric: AxOx3, judgement and insight normal; moving all extremities  Skin: no rashes; no palpable lesions    Labs: all available labs reviewed                              14.2   12.57 )-----------( 391      ( 12 Feb 2020 09:07 )             42.9     02-12    136  |  103  |  11  ----------------------------<  204<H>  3.9   |  26  |  0.77    Ca    9.1      12 Feb 2020 09:07

## 2020-02-12 NOTE — CONSULT NOTE ADULT - ASSESSMENT
38 y/o Male with h/o obesity was admitted on 2/7 with left upper thigh pain and swelling. His symptoms originally began 4 days PTA with swelling and redness. He was seen in the ED the day after and was diagnosed with cellulitis and prescribed BID Keflex and BID Bactrim. His swelling never truly improved, but today, his pain and redness worsened, along with a "black spot" that developed, making him concerned for necrosis. He denies fevers, chills at home. He does note a previous ingrown hair at that site that he used to pick at in the past. No trauma to that site. In ER he received vancomycin IV and zosyn.       bedside debridement of nonvitalized tissue performed. no active drainage. daily dressing changes to be performed by primary team   continue abx per id.

## 2020-02-12 NOTE — PROGRESS NOTE ADULT - SUBJECTIVE AND OBJECTIVE BOX
CHIEF COMPLAINT/diagnosis: left groin infection/  left groin abcess    SUBJECTIVE: left groin looks about same as yesturday, large fluctuance, purelent discharge commign out.     REVIEW OF SYSTEMS:    CONSTITUTIONAL: No weakness, fevers or chills  EYES/ENT: No visual changes;  No vertigo or throat pain   NECK: No pain or stiffness  RESPIRATORY: No cough, wheezing, hemoptysis; No shortness of breath  CARDIOVASCULAR: No chest pain or palpitations  GASTROINTESTINAL: No abdominal or epigastric pain. No nausea, vomiting, or hematemesis; No diarrhea or constipation. No melena or hematochezia.  GENITOURINARY: No dysuria, frequency or hematuria  NEUROLOGICAL: No numbness or weakness  SKIN: No itching, burning, rashes, or lesions   All other review of systems is negative unless indicated above    Vital Signs Last 24 Hrs  T(C): 36.4 (12 Feb 2020 11:41), Max: 37.2 (12 Feb 2020 05:29)  T(F): 97.6 (12 Feb 2020 11:41), Max: 99 (12 Feb 2020 05:29)  HR: 82 (12 Feb 2020 11:41) (77 - 92)  BP: 128/60 (12 Feb 2020 11:41) (103/50 - 128/60)  BP(mean): --  RR: 18 (12 Feb 2020 11:41) (17 - 18)  SpO2: 97% (12 Feb 2020 11:41) (95% - 98%)    I&O's Summary      CAPILLARY BLOOD GLUCOSE      POCT Blood Glucose.: 189 mg/dL (12 Feb 2020 08:45)  POCT Blood Glucose.: 171 mg/dL (11 Feb 2020 21:25)  POCT Blood Glucose.: 155 mg/dL (11 Feb 2020 17:40)  POCT Blood Glucose.: 151 mg/dL (11 Feb 2020 13:51)      PHYSICAL EXAM:    Constitutional: NAD, awake and alert, well-developed  HEENT: PERR, EOMI, Normal Hearing, MMM  Neck: Soft and supple, No LAD, No JVD  Respiratory: Breath sounds are clear bilaterally, No wheezing, rales or rhonchi  Cardiovascular: S1 and S2, regular rate and rhythm, no Murmurs, gallops or rubs  Gastrointestinal: Bowel Sounds present, soft, nontender, nondistended, no guarding, no rebound  Extremities: No peripheral edema  Vascular: 2+ peripheral pulses  Neurological: A/O x 3, no focal deficits  Musculoskeletal: 5/5 strength b/l upper and lower extremities  Skin: No rashes    MEDICATIONS:  MEDICATIONS  (STANDING):  cefTRIAXone Injectable. 2000 milliGRAM(s) IV Push every 24 hours  collagenase Ointment 1 Application(s) Topical daily  dextrose 5%. 1000 milliLiter(s) (50 mL/Hr) IV Continuous <Continuous>  dextrose 50% Injectable 12.5 Gram(s) IV Push once  dextrose 50% Injectable 25 Gram(s) IV Push once  dextrose 50% Injectable 25 Gram(s) IV Push once  influenza   Vaccine 0.5 milliLiter(s) IntraMuscular once  insulin lispro (HumaLOG) corrective regimen sliding scale   SubCutaneous three times a day before meals  insulin lispro (HumaLOG) corrective regimen sliding scale   SubCutaneous at bedtime  lactobacillus acidophilus 1 Tablet(s) Oral daily  nystatin Powder 1 Application(s) Topical three times a day  vancomycin  IVPB 1000 milliGRAM(s) IV Intermittent every 8 hours      LABS: All Labs Reviewed:                        14.2   12.57 )-----------( 391      ( 12 Feb 2020 09:07 )             42.9     02-12    136  |  103  |  11  ----------------------------<  204<H>  3.9   |  26  |  0.77    Ca    9.1      12 Feb 2020 09:07            Blood Culture: 02-08 @ 09:35  Organism --  Gram Stain Blood -- Gram Stain --  Specimen Source .Abscess left thigh  Culture-Blood --    02-07 @ 19:58  Organism --  Gram Stain Blood -- Gram Stain --  Specimen Source .Blood None  Culture-Blood --          Assessment and Plan:     40 yo M with no pertinent PMH presents with left upper thigh pain and swelling, found to be due to cellulitis.    #Sepsis 2ndry to Cellulitis of left groin w/ likley underlying abcess  -CT did not show any abcess, however there is clearly fluctuance at left groin with purelent discharge draining.  -will place surgery consult to evalute for need for debridement.  Kelly this will heal on its own with abx, will d/w surgery.   - Meets sepsis criteria with leukocytosis and tachycardia on admission  - Upper thigh, with pain, failed outpatient PO antibiotic therapy  - Blood Cx NGTD  - continue Vanc/Rocephin   - ID consult appreciated     #Lactic acidosis  resolved   - Mild, 2.1 , repeat WNL  - s/p 2L IVFs  - Cellulitis workup otherwise noted    #Hyperglycemia 2ndry to New onset of DM  - A1C 10.5  - Check FS QAC + HS, Continue ISS  - FU lipid panel in AM  - Nutrition consult  - Will need to be D/C on Metformin     #Prophylactic measure  - DVT PPX: IMPROVE score of 0, low risk, no pharmacological PPX needed, will give SCDs

## 2020-02-12 NOTE — PROGRESS NOTE ADULT - SUBJECTIVE AND OBJECTIVE BOX
Date of service: 02-12-20 @ 10:45    Lying in bed in NAD  Weak looking  Left upper thigh feels less swollen  Scant wound discharge present  Local pain  Has low grade fever    ROS: denies dizziness, no HA, no SOB or cough, no abdominal pain, no diarrhea or constipation; no dysuria    MEDICATIONS  (STANDING):  cefTRIAXone Injectable. 2000 milliGRAM(s) IV Push every 24 hours  collagenase Ointment 1 Application(s) Topical daily  dextrose 5%. 1000 milliLiter(s) (50 mL/Hr) IV Continuous <Continuous>  dextrose 50% Injectable 12.5 Gram(s) IV Push once  dextrose 50% Injectable 25 Gram(s) IV Push once  dextrose 50% Injectable 25 Gram(s) IV Push once  influenza   Vaccine 0.5 milliLiter(s) IntraMuscular once  insulin lispro (HumaLOG) corrective regimen sliding scale   SubCutaneous three times a day before meals  insulin lispro (HumaLOG) corrective regimen sliding scale   SubCutaneous at bedtime  lactobacillus acidophilus 1 Tablet(s) Oral daily  nystatin Powder 1 Application(s) Topical three times a day  vancomycin  IVPB 1000 milliGRAM(s) IV Intermittent every 8 hours      Vital Signs Last 24 Hrs  T(C): 37.2 (12 Feb 2020 05:29), Max: 37.2 (12 Feb 2020 05:29)  T(F): 99 (12 Feb 2020 05:29), Max: 99 (12 Feb 2020 05:29)  HR: 77 (12 Feb 2020 05:29) (77 - 93)  BP: 103/50 (12 Feb 2020 05:29) (103/50 - 145/85)  BP(mean): --  RR: 18 (12 Feb 2020 05:29) (17 - 18)  SpO2: 95% (12 Feb 2020 05:29) (95% - 98%)    Physical Exam:      Constitutional:  No acute distress  HEENT: NC/AT, EOMI, PERRLA, conjunctivae clear  Neck: supple; thyroid not palpable  Back: no tenderness  Respiratory: respiratory effort normal; clear to auscultation  Cardiovascular: S1S2 regular, no murmurs  Abdomen: soft, not tender, not distended, positive BS  Genitourinary: no suprapubic tenderness  Lymphatic: no LN palpable  Musculoskeletal: no muscle tenderness, no joint swelling or tenderness  Extremities: no pedal edema  Left medial thigh area or redness, induration, tenderness, warmth; open wound with; scant drainage; - improving  Erythema improving  Neurological/ Psychiatric: AxOx3, moving all extremities  Skin: no rashes; no palpable lesions    Labs: reviewed                        14.2   12.57 )-----------( 391      ( 12 Feb 2020 09:07 )             42.9     02-12    136  |  103  |  11  ----------------------------<  204<H>  3.9   |  26  |  0.77    Ca    9.1      12 Feb 2020 09:07               Vancomycin Level, Trough: 8.4 ug/mL (02-10 @ 04:51)  Vancomycin Level, Trough: 3.6 ug/mL (02-09 @ 06:02)                        14.8   12.53 )-----------( 382      ( 09 Feb 2020 06:02 )             43.5     02-08    134<L>  |  103  |  9   ----------------------------<  244<H>  3.9   |  25  |  0.80    Ca    8.3<L>      08 Feb 2020 09:15  Phos  2.9     02-08  Mg     1.9     02-08    TPro  8.1  /  Alb  3.6  /  TBili  0.4  /  DBili  x   /  AST  12<L>  /  ALT  35  /  AlkPhos  106  02-07    Vancomycin Level, Trough: 3.6 ug/mL (02-09 @ 06:02)                          14.6   14.47 )-----------( 395      ( 07 Feb 2020 19:58 )             44.3     02-07    132<L>  |  101  |  11  ----------------------------<  372<H>  4.1   |  25  |  1.06    Ca    8.9      07 Feb 2020 19:58    TPro  8.1  /  Alb  3.6  /  TBili  0.4  /  DBili  x   /  AST  12<L>  /  ALT  35  /  AlkPhos  106  02-07     LIVER FUNCTIONS - ( 07 Feb 2020 19:58 )  Alb: 3.6 g/dL / Pro: 8.1 gm/dL / ALK PHOS: 106 U/L / ALT: 35 U/L / AST: 12 U/L / GGT: x           Culture - Abscess with Gram Stain (collected 08 Feb 2020 09:35)  Source: .Abscess left thigh  Final Report (10 Feb 2020 15:28):    Few Coag Negative Staphylococcus "Susceptibilities not performed"    Few Finegoldia magna "Susceptibilities not performed"    Rare Streptococcus agalactiae (Group B) Streptococcus agalactiae (Group    B) isolated    Culture - Blood (collected 07 Feb 2020 19:58)  Source: .Blood None  Preliminary Report (09 Feb 2020 02:02):    No growth to date.    Culture - Blood (collected 07 Feb 2020 19:58)  Source: .Blood None  Preliminary Report (09 Feb 2020 02:02):    No growth to date.        Radiology: all available radiological tests reviewed    Advanced directives addressed: full resuscitation

## 2020-02-12 NOTE — PROGRESS NOTE ADULT - ASSESSMENT
40 y/o Male with h/o obesity was admitted on 2/7 with left upper thigh pain and swelling. His symptoms originally began 4 days PTA with swelling and redness. He was seen in the ED the day after and was diagnosed with cellulitis and prescribed BID Keflex and BID Bactrim. His swelling never truly improved, but today, his pain and redness worsened, along with a "black spot" that developed, making him concerned for necrosis. He denies fevers, chills at home. He does note a previous ingrown hair at that site that he used to pick at in the past. No trauma to that site. In ER he received vancomycin IV and zosyn.     1. Left LE cellulitis with draining furuncle with strep group B, finegoldia magna, CNST. Obesity.   -leukocytosis improved, but persistent  -has low grade fever  -BC x 2 are negative to date  -would culture noted; no MRSA noted  -on vancomycin 1000 mg IV q8h and ceftriaxone 2 gm IV qd # 5  -tolerating abx well so far; no side effects noted  -continue abx coverage  -local wound care  -elevate leg  -monitor temps  -f/u CBC  -supportive care  2. Other issues:   -care per medicine

## 2020-02-12 NOTE — CONSULT NOTE ADULT - ATTENDING COMMENTS
I agree with resident's assessment & plan above    Exam  Gen: NAD, AAOx3  Abd: Obese, soft, NT, ND  Ext: LLE/groin open wound with scant serous drainage with improving erythema, induration w/o fluctuance     Plan  - Abx per ID  - Local wound care  - Discussed with patient cellulitis and abscess and I&D  - D/W patient bariatric surgery, and he will follow-up as an outpatient

## 2020-02-13 VITALS
RESPIRATION RATE: 17 BRPM | SYSTOLIC BLOOD PRESSURE: 123 MMHG | DIASTOLIC BLOOD PRESSURE: 72 MMHG | TEMPERATURE: 99 F | HEART RATE: 80 BPM | OXYGEN SATURATION: 97 %

## 2020-02-13 DIAGNOSIS — L02.214 CUTANEOUS ABSCESS OF GROIN: ICD-10-CM

## 2020-02-13 LAB
CULTURE RESULTS: SIGNIFICANT CHANGE UP
CULTURE RESULTS: SIGNIFICANT CHANGE UP
SPECIMEN SOURCE: SIGNIFICANT CHANGE UP
SPECIMEN SOURCE: SIGNIFICANT CHANGE UP

## 2020-02-13 PROCEDURE — 99239 HOSP IP/OBS DSCHRG MGMT >30: CPT

## 2020-02-13 RX ORDER — ENOXAPARIN SODIUM 100 MG/ML
10 INJECTION SUBCUTANEOUS
Qty: 1 | Refills: 1
Start: 2020-02-13 | End: 2020-04-12

## 2020-02-13 RX ORDER — INSULIN GLARGINE 100 [IU]/ML
10 INJECTION, SOLUTION SUBCUTANEOUS
Qty: 1 | Refills: 1
Start: 2020-02-13 | End: 2020-04-12

## 2020-02-13 RX ORDER — METFORMIN HYDROCHLORIDE 850 MG/1
1 TABLET ORAL
Qty: 60 | Refills: 1
Start: 2020-02-13 | End: 2020-04-12

## 2020-02-13 RX ORDER — ISOPROPYL ALCOHOL, BENZOCAINE .7; .06 ML/ML; ML/ML
1 SWAB TOPICAL
Qty: 75 | Refills: 1
Start: 2020-02-13 | End: 2020-04-02

## 2020-02-13 RX ORDER — LACTOBACILLUS ACIDOPHILUS 100MM CELL
1 CAPSULE ORAL
Qty: 0 | Refills: 0 | DISCHARGE
Start: 2020-02-13

## 2020-02-13 RX ORDER — NYSTATIN CREAM 100000 [USP'U]/G
1 CREAM TOPICAL
Qty: 1 | Refills: 0
Start: 2020-02-13 | End: 2020-02-26

## 2020-02-13 RX ADMIN — Medication 250 MILLIGRAM(S): at 05:04

## 2020-02-13 RX ADMIN — NYSTATIN CREAM 1 APPLICATION(S): 100000 CREAM TOPICAL at 14:13

## 2020-02-13 RX ADMIN — Medication 2: at 08:36

## 2020-02-13 RX ADMIN — Medication 1 TABLET(S): at 12:46

## 2020-02-13 RX ADMIN — Medication 2: at 12:46

## 2020-02-13 RX ADMIN — Medication 250 MILLIGRAM(S): at 14:22

## 2020-02-13 RX ADMIN — Medication 1 APPLICATION(S): at 12:36

## 2020-02-13 RX ADMIN — CEFTRIAXONE 2000 MILLIGRAM(S): 500 INJECTION, POWDER, FOR SOLUTION INTRAMUSCULAR; INTRAVENOUS at 12:46

## 2020-02-13 RX ADMIN — NYSTATIN CREAM 1 APPLICATION(S): 100000 CREAM TOPICAL at 05:08

## 2020-02-13 NOTE — PROGRESS NOTE ADULT - PROBLEM SELECTOR PLAN 1
- Local wound care  - Abx per ID  - From surgical standpoint clear for discharge with f/u in office in 2 weeks.

## 2020-02-13 NOTE — PROGRESS NOTE ADULT - SUBJECTIVE AND OBJECTIVE BOX
Date of service: 02-13-20 @ 09:56    Sitting in bed in NAD  Left inguinal ulcer s/p I and D by surgery  Induration is improving  Still erythema    ROS: no fever or chills; denies dizziness, no HA, no SOB or cough, no abdominal pain, no diarrhea or constipation; no dysuria    MEDICATIONS  (STANDING):  cefTRIAXone Injectable. 2000 milliGRAM(s) IV Push every 24 hours  collagenase Ointment 1 Application(s) Topical daily  dextrose 5%. 1000 milliLiter(s) (50 mL/Hr) IV Continuous <Continuous>  dextrose 50% Injectable 12.5 Gram(s) IV Push once  dextrose 50% Injectable 25 Gram(s) IV Push once  dextrose 50% Injectable 25 Gram(s) IV Push once  influenza   Vaccine 0.5 milliLiter(s) IntraMuscular once  insulin lispro (HumaLOG) corrective regimen sliding scale   SubCutaneous three times a day before meals  insulin lispro (HumaLOG) corrective regimen sliding scale   SubCutaneous at bedtime  lactobacillus acidophilus 1 Tablet(s) Oral daily  nystatin Powder 1 Application(s) Topical three times a day  vancomycin  IVPB 1000 milliGRAM(s) IV Intermittent every 8 hours      Vital Signs Last 24 Hrs  T(C): 37 (13 Feb 2020 05:00), Max: 37 (12 Feb 2020 17:05)  T(F): 98.6 (13 Feb 2020 05:00), Max: 98.6 (12 Feb 2020 17:05)  HR: 69 (13 Feb 2020 05:00) (69 - 86)  BP: 106/57 (13 Feb 2020 05:00) (106/57 - 128/60)  BP(mean): --  RR: 16 (13 Feb 2020 05:00) (16 - 18)  SpO2: 96% (13 Feb 2020 05:00) (96% - 99%)    Physical Exam:      Constitutional:  No acute distress  HEENT: NC/AT, EOMI, PERRLA, conjunctivae clear  Neck: supple; thyroid not palpable  Back: no tenderness  Respiratory: respiratory effort normal; clear to auscultation  Cardiovascular: S1S2 regular, no murmurs  Abdomen: soft, not tender, not distended, positive BS  Genitourinary: no suprapubic tenderness  Lymphatic: no LN palpable  Musculoskeletal: no muscle tenderness, no joint swelling or tenderness  Extremities: no pedal edema  Left medial thigh area or redness, induration, tenderness, warmth; open wound with; scant drainage; - improving  Erythema improving  Neurological/ Psychiatric: AxOx3, moving all extremities  Skin: no rashes; no palpable lesions    Labs: reviewed                        14.2   12.57 )-----------( 391      ( 12 Feb 2020 09:07 )             42.9     02-12    136  |  103  |  11  ----------------------------<  204<H>  3.9   |  26  |  0.77    Ca    9.1      12 Feb 2020 09:07               Vancomycin Level, Trough: 8.4 ug/mL (02-10 @ 04:51)  Vancomycin Level, Trough: 3.6 ug/mL (02-09 @ 06:02)                        14.8   12.53 )-----------( 382      ( 09 Feb 2020 06:02 )             43.5     02-08    134<L>  |  103  |  9   ----------------------------<  244<H>  3.9   |  25  |  0.80    Ca    8.3<L>      08 Feb 2020 09:15  Phos  2.9     02-08  Mg     1.9     02-08    TPro  8.1  /  Alb  3.6  /  TBili  0.4  /  DBili  x   /  AST  12<L>  /  ALT  35  /  AlkPhos  106  02-07    Vancomycin Level, Trough: 3.6 ug/mL (02-09 @ 06:02)                          14.6   14.47 )-----------( 395      ( 07 Feb 2020 19:58 )             44.3     02-07    132<L>  |  101  |  11  ----------------------------<  372<H>  4.1   |  25  |  1.06    Ca    8.9      07 Feb 2020 19:58    TPro  8.1  /  Alb  3.6  /  TBili  0.4  /  DBili  x   /  AST  12<L>  /  ALT  35  /  AlkPhos  106  02-07     LIVER FUNCTIONS - ( 07 Feb 2020 19:58 )  Alb: 3.6 g/dL / Pro: 8.1 gm/dL / ALK PHOS: 106 U/L / ALT: 35 U/L / AST: 12 U/L / GGT: x           Culture - Abscess with Gram Stain (collected 08 Feb 2020 09:35)  Source: .Abscess left thigh  Final Report (10 Feb 2020 15:28):    Few Coag Negative Staphylococcus "Susceptibilities not performed"    Few Finegoldia magna "Susceptibilities not performed"    Rare Streptococcus agalactiae (Group B) Streptococcus agalactiae (Group    B) isolated    Culture - Blood (collected 07 Feb 2020 19:58)  Source: .Blood None  Preliminary Report (09 Feb 2020 02:02):    No growth to date.    Culture - Blood (collected 07 Feb 2020 19:58)  Source: .Blood None  Preliminary Report (09 Feb 2020 02:02):    No growth to date.        Radiology: all available radiological tests reviewed    Advanced directives addressed: full resuscitation

## 2020-02-13 NOTE — PROGRESS NOTE ADULT - SUBJECTIVE AND OBJECTIVE BOX
Post procedure Day#: 1  Procedure:  I&D for L-groin wound/abscess     The patient is doing well without complaints.    Vital Signs Last 24 Hrs  T(C): 37.1 (13 Feb 2020 11:00), Max: 37.1 (13 Feb 2020 11:00)  T(F): 98.8 (13 Feb 2020 11:00), Max: 98.8 (13 Feb 2020 11:00)  HR: 80 (13 Feb 2020 11:00) (69 - 86)  BP: 123/72 (13 Feb 2020 11:00) (106/57 - 128/60)  BP(mean): --  RR: 17 (13 Feb 2020 11:00) (16 - 18)  SpO2: 97% (13 Feb 2020 11:00) (96% - 99%)    PHYSICAL EXAM:  General: NAD.  HEENT: no JVD, no jaundice.  LUNGS: CTAB.  Heart: S1 S2 RRR  Abd: obese, soft, nt/nd   Ext: L-groin wound with scant serous drainage induration w/o fluctuance & improving erythema                              14.2   12.57 )-----------( 391      ( 12 Feb 2020 09:07 )             42.9       02-12    136  |  103  |  11  ----------------------------<  204<H>  3.9   |  26  |  0.77    Ca    9.1      12 Feb 2020 09:07

## 2020-02-13 NOTE — DISCHARGE NOTE PROVIDER - NSDCCAREPROVSEEN_GEN_ALL_CORE_FT
Patient admitted to our facility 2/7/20 for acute medical care.   Please excuse patient from work as he needs continued outpateint follows ups and will not be able to return from work till 2/17/20.  Dr. Robyn Frias -[phone]

## 2020-02-13 NOTE — DISCHARGE NOTE PROVIDER - NSDCMRMEDTOKEN_GEN_ALL_CORE_FT
alcohol swabs : Apply topically to affected area 3 times a day   amoxicillin-clavulanate 875 mg-125 mg oral tablet: 1 milligram(s) orally 2 times a day   glucometer (per patient&#x27;s insurance): 1 application subcutaneous 3 times a day   Insulin Pen Needles, 4mm: 1 application subcutaneously 3 times a day . ** Use with insulin pen **   lactobacillus acidophilus oral capsule: 1 tab(s) orally once a day  lancets: 1 application subcutaneously 3 times a day   Lantus Solostar Pen 100 units/mL subcutaneous solution: 10 unit(s) subcutaneous once a day (at bedtime)   metFORMIN 500 mg oral tablet: 1 tab(s) orally 2 times a day   nystatin 100,000 units/g topical powder: 1 application topically 3 times a day  test strips (per patient&#x27;s insurance): 1 application subcutaneously 3 times a day . ** Compatible with patient&#x27;s glucometer ** alcohol swabs : Apply topically to affected area 3 times a day   amoxicillin-clavulanate 875 mg-125 mg oral tablet: 1 milligram(s) orally 2 times a day   Basaglar KwikPen 100 units/mL subcutaneous solution: 10 unit(s) subcutaneous once a day (at bedtime)   glucometer (per patient&#x27;s insurance): 1 application subcutaneous 3 times a day   Insulin Pen Needles, 4mm: 1 application subcutaneously 3 times a day . ** Use with insulin pen **   lactobacillus acidophilus oral capsule: 1 tab(s) orally once a day  lancets: 1 application subcutaneously 3 times a day   Lantus Solostar Pen 100 units/mL subcutaneous solution: 10 unit(s) subcutaneous once a day (at bedtime)   metFORMIN 500 mg oral tablet: 1 tab(s) orally 2 times a day   nystatin 100,000 units/g topical powder: 1 application topically 3 times a day  test strips (per patient&#x27;s insurance): 1 application subcutaneously 3 times a day . ** Compatible with patient&#x27;s glucometer **

## 2020-02-13 NOTE — DISCHARGE NOTE PROVIDER - NSDCCPCAREPLAN_GEN_ALL_CORE_FT
PRINCIPAL DISCHARGE DIAGNOSIS  Diagnosis: Cellulitis and abscess of leg  Assessment and Plan of Treatment: keep left groin area clean and dry at all times. apply dry gauze to area and keep covered.   Take oral augmentin twice a day for 10 more days.      SECONDARY DISCHARGE DIAGNOSES  Diagnosis: DM (diabetes mellitus)  Assessment and Plan of Treatment: start oral metformin twice a day  start injectable lantus every night. Check your Fingerstick 3 times a day including at night before injecting lantus. Hold lantus if your Fingerstick is below 120.  follow up out patient with endocrinologist in one week.

## 2020-02-13 NOTE — DISCHARGE NOTE PROVIDER - CARE PROVIDER_API CALL
Lennox Angel)  EndocrinologyMetabDiabetes; Internal Medicine  [address] [place] [address]  Phone: [phone]  Fax: [phone]  Follow Up Time:     Shimon Weber)  Family Medicine  [address] [place] [address]  Phone: [phone]  Fax: [phone]  Follow Up Time:

## 2020-02-13 NOTE — PROGRESS NOTE ADULT - REASON FOR ADMISSION
cellulitis

## 2020-02-13 NOTE — PROGRESS NOTE ADULT - ASSESSMENT
40 y/o Male with h/o obesity was admitted on 2/7 with left upper thigh pain and swelling. His symptoms originally began 4 days PTA with swelling and redness. He was seen in the ED the day after and was diagnosed with cellulitis and prescribed BID Keflex and BID Bactrim. His swelling never truly improved, but today, his pain and redness worsened, along with a "black spot" that developed, making him concerned for necrosis. He denies fevers, chills at home. He does note a previous ingrown hair at that site that he used to pick at in the past. No trauma to that site. In ER he received vancomycin IV and zosyn.     1. Left LE cellulitis with draining furuncle with strep group B, finegoldia magna, CNST. Obesity.   -leukocytosis improved, but persistent  -has low grade fever  -BC x 2 are negative to date  -would culture noted; no MRSA noted  -on vancomycin 1000 mg IV q8h and ceftriaxone 2 gm IV qd # 5  -tolerating abx well so far; no side effects noted  -continue abx coverage  -local wound care  -elevate leg  -monitor temps  -f/u CBC  -supportive care  2. Other issues:   -care per medicine 38 y/o Male with h/o obesity was admitted on 2/7 with left upper thigh pain and swelling. His symptoms originally began 4 days PTA with swelling and redness. He was seen in the ED the day after and was diagnosed with cellulitis and prescribed BID Keflex and BID Bactrim. His swelling never truly improved, but today, his pain and redness worsened, along with a "black spot" that developed, making him concerned for necrosis. He denies fevers, chills at home. He does note a previous ingrown hair at that site that he used to pick at in the past. No trauma to that site. In ER he received vancomycin IV and zosyn.     1. Left LE cellulitis with draining furuncle with strep group B, finegoldia magna, CNST. Obesity.   -leukocytosis improved, but persistent  -has low grade fever  -BC x 2 are negative to date  -would culture noted; no MRSA noted  -on vancomycin 1000 mg IV q8h and ceftriaxone 2 gm IV qd # 6  -tolerating abx well so far; no side effects noted  -change abx to augmentin 875 mg PO q12h for 10 more days  -local wound care  -elevate leg  -monitor temps  -f/u CBC  -supportive care  2. Other issues:   -care per medicine

## 2020-02-13 NOTE — DISCHARGE NOTE PROVIDER - CARE PROVIDERS DIRECT ADDRESSES
[email or web address]

## 2020-02-13 NOTE — DISCHARGE NOTE NURSING/CASE MANAGEMENT/SOCIAL WORK - PATIENT PORTAL LINK FT
You can access the FollowMyHealth Patient Portal offered by [facility] by registering at the following website: [email or web address]. By joining [facility]’s FollowMyHealth portal, you will also be able to view your health information using other applications (apps) compatible with our system.

## 2020-02-13 NOTE — DISCHARGE NOTE PROVIDER - HOSPITAL COURSE
Vital Signs Last 24 Hrs    T(C): 37.1 (13 Feb 2020 11:00), Max: 37.1 (13 Feb 2020 11:00)    T(F): 98.8 (13 Feb 2020 11:00), Max: 98.8 (13 Feb 2020 11:00)    HR: 80 (13 Feb 2020 11:00) (69 - 86)    BP: 123/72 (13 Feb 2020 11:00) (106/57 - 128/60)    BP(mean): --    RR: 17 (13 Feb 2020 11:00) (16 - 18)    SpO2: 97% (13 Feb 2020 11:00) (96% - 99%)        HEENT:   pupils equal and reactive, EOMI, no oropharyngeal lesions, erythema, exudates, oral thrush        NECK:   supple, no carotid bruits, no palpable lymph nodes, no thyromegaly        CV:  +S1, +S2, regular, no murmurs or rubs        RESP:   lungs clear to auscultation bilaterally, no wheezing, rales, rhonchi, good air entry bilaterally        BREAST:  not examined        GI:  abdomen soft, non-tender, non-distended, normal BS, no bruits, no abdominal masses, no palpable masses        RECTAL:  not examined        :  not examined        MSK:   normal muscle tone, no atrophy, no rigidity, no contractions        EXT:   no clubbing, no cyanosis, no edema, no calf pain, swelling or erythema        VASCULAR:  pulses equal and symmetric in the upper and lower extremities        NEURO:  AAOX3, no focal neurological deficits, follows all commands, able to move extremities spontaneously        SKIN:  no ulcers, lesions or rashes        12 Feb 2020 09:07        136    |  103    |  11       ----------------------------<  204      3.9     |  26     |  0.77         Ca    9.1        12 Feb 2020 09:07        CBC Full  -  ( 12 Feb 2020 09:07 )    WBC Count : 12.57 K/uL    Hemoglobin : 14.2 g/dL    Hematocrit : 42.9 %    Platelet Count - Automated : 391 K/uL    Mean Cell Volume : 90.1 fl    Mean Cell Hemoglobin : 29.8 pg    Mean Cell Hemoglobin Concentration : 33.1 gm/dL                    Hospital Course        40 yo M with no pertinent PMH presents with left upper thigh pain and swelling, found to be due to cellulitis.        #Sepsis 2ndry to Cellulitis of left groin w/ likcintia underlying abcess    -CT did not show any abcess, however there is clearly fluctuance at left groin with purelent discharge draining.     - Vanc/Rocephin  for several days; Evaluated by surgery team and patient s/p debridement; purelent drainage is now significantly lessened. and the edema is significantly improved.     -Patient will now be discharged with oral Augment for 10 more days.         other concerns:    Newly Diagnosed DM type II.     - A1C 10.5    -patient will be started on injectable Lantus qhs along with Metformin.     -he is educated on FS three times a day and outpatient f/u with endo.

## 2020-02-19 DIAGNOSIS — E66.01 MORBID (SEVERE) OBESITY DUE TO EXCESS CALORIES: ICD-10-CM

## 2020-02-19 DIAGNOSIS — E87.2 ACIDOSIS: ICD-10-CM

## 2020-02-19 DIAGNOSIS — L02.214 CUTANEOUS ABSCESS OF GROIN: ICD-10-CM

## 2020-02-19 DIAGNOSIS — Z83.3 FAMILY HISTORY OF DIABETES MELLITUS: ICD-10-CM

## 2020-02-19 DIAGNOSIS — A41.9 SEPSIS, UNSPECIFIED ORGANISM: ICD-10-CM

## 2020-02-19 DIAGNOSIS — L03.311 CELLULITIS OF ABDOMINAL WALL: ICD-10-CM

## 2020-02-19 DIAGNOSIS — B95.1 STREPTOCOCCUS, GROUP B, AS THE CAUSE OF DISEASES CLASSIFIED ELSEWHERE: ICD-10-CM

## 2020-02-19 DIAGNOSIS — Z87.891 PERSONAL HISTORY OF NICOTINE DEPENDENCE: ICD-10-CM

## 2020-02-19 DIAGNOSIS — E11.65 TYPE 2 DIABETES MELLITUS WITH HYPERGLYCEMIA: ICD-10-CM

## 2020-02-19 DIAGNOSIS — Z82.49 FAMILY HISTORY OF ISCHEMIC HEART DISEASE AND OTHER DISEASES OF THE CIRCULATORY SYSTEM: ICD-10-CM

## 2020-02-19 DIAGNOSIS — Z82.0 FAMILY HISTORY OF EPILEPSY AND OTHER DISEASES OF THE NERVOUS SYSTEM: ICD-10-CM

## 2020-02-19 DIAGNOSIS — L03.116 CELLULITIS OF LEFT LOWER LIMB: ICD-10-CM

## 2020-04-01 NOTE — CDI QUERY NOTE - NSCDIOTHERTXTBX_GEN_ALL_CORE_HH
Documentation on chart of:    Debridement - Subcutaneous     s/p  I&D for L-groin wound/abscess  in progress notes    Please clarify and include the following additional detail:  * The instrument alone will not dictate the classification of excisional debridement.     Type of debridement (excisional vs. non-excisional debridement)          Excisional:   the removal of necrotic devitalized tissue or slough by means of cutting away of tissue, slough, or necrosis.          Non-excisional:  non-operative brushing, irrigation, scrubbing, washing (minor removal of loose fragments).    Site debrided - anatomical location & laterality    Instrument(s) used - e.g. scalpel, knife, versa jet    Depth of tissue debrided such as:  Skin,  Fascia  Subcutaneous  Muscle  Tendon  Bone     A statement referring to “down to the level of ___” does not describe the specificity for coding.

## 2021-01-18 ENCOUNTER — EMERGENCY (EMERGENCY)
Facility: HOSPITAL | Age: 41
LOS: 0 days | Discharge: HOME | End: 2021-01-18
Attending: EMERGENCY MEDICINE
Payer: COMMERCIAL

## 2021-01-18 VITALS
RESPIRATION RATE: 20 BRPM | SYSTOLIC BLOOD PRESSURE: 147 MMHG | TEMPERATURE: 99 F | HEART RATE: 89 BPM | DIASTOLIC BLOOD PRESSURE: 91 MMHG | OXYGEN SATURATION: 97 %

## 2021-01-18 VITALS — WEIGHT: 220.02 LBS

## 2021-01-18 DIAGNOSIS — E66.01 MORBID (SEVERE) OBESITY DUE TO EXCESS CALORIES: ICD-10-CM

## 2021-01-18 DIAGNOSIS — S52.123A DISPLACED FRACTURE OF HEAD OF UNSPECIFIED RADIUS, INITIAL ENCOUNTER FOR CLOSED FRACTURE: Chronic | ICD-10-CM

## 2021-01-18 DIAGNOSIS — L02.215 CUTANEOUS ABSCESS OF PERINEUM: ICD-10-CM

## 2021-01-18 DIAGNOSIS — Z79.4 LONG TERM (CURRENT) USE OF INSULIN: ICD-10-CM

## 2021-01-18 DIAGNOSIS — E11.9 TYPE 2 DIABETES MELLITUS WITHOUT COMPLICATIONS: ICD-10-CM

## 2021-01-18 DIAGNOSIS — Z97.2 PRESENCE OF DENTAL PROSTHETIC DEVICE (COMPLETE) (PARTIAL): Chronic | ICD-10-CM

## 2021-01-18 DIAGNOSIS — Z98.890 OTHER SPECIFIED POSTPROCEDURAL STATES: Chronic | ICD-10-CM

## 2021-01-18 LAB
ALBUMIN SERPL ELPH-MCNC: 3.5 G/DL — SIGNIFICANT CHANGE UP (ref 3.3–5)
ALP SERPL-CCNC: 86 U/L — SIGNIFICANT CHANGE UP (ref 40–120)
ALT FLD-CCNC: 38 U/L — SIGNIFICANT CHANGE UP (ref 12–78)
ANION GAP SERPL CALC-SCNC: 7 MMOL/L — SIGNIFICANT CHANGE UP (ref 5–17)
AST SERPL-CCNC: 15 U/L — SIGNIFICANT CHANGE UP (ref 15–37)
BASOPHILS # BLD AUTO: 0.12 K/UL — SIGNIFICANT CHANGE UP (ref 0–0.2)
BASOPHILS NFR BLD AUTO: 0.8 % — SIGNIFICANT CHANGE UP (ref 0–2)
BILIRUB SERPL-MCNC: 0.4 MG/DL — SIGNIFICANT CHANGE UP (ref 0.2–1.2)
BUN SERPL-MCNC: 12 MG/DL — SIGNIFICANT CHANGE UP (ref 7–23)
CALCIUM SERPL-MCNC: 9 MG/DL — SIGNIFICANT CHANGE UP (ref 8.5–10.1)
CHLORIDE SERPL-SCNC: 102 MMOL/L — SIGNIFICANT CHANGE UP (ref 96–108)
CO2 SERPL-SCNC: 26 MMOL/L — SIGNIFICANT CHANGE UP (ref 22–31)
CREAT SERPL-MCNC: 0.79 MG/DL — SIGNIFICANT CHANGE UP (ref 0.5–1.3)
EOSINOPHIL # BLD AUTO: 0.55 K/UL — HIGH (ref 0–0.5)
EOSINOPHIL NFR BLD AUTO: 3.7 % — SIGNIFICANT CHANGE UP (ref 0–6)
GLUCOSE SERPL-MCNC: 120 MG/DL — HIGH (ref 70–99)
HCT VFR BLD CALC: 45 % — SIGNIFICANT CHANGE UP (ref 39–50)
HGB BLD-MCNC: 15.2 G/DL — SIGNIFICANT CHANGE UP (ref 13–17)
IMM GRANULOCYTES NFR BLD AUTO: 0.6 % — SIGNIFICANT CHANGE UP (ref 0–1.5)
LYMPHOCYTES # BLD AUTO: 22.9 % — SIGNIFICANT CHANGE UP (ref 13–44)
LYMPHOCYTES # BLD AUTO: 3.43 K/UL — HIGH (ref 1–3.3)
MCHC RBC-ENTMCNC: 30.3 PG — SIGNIFICANT CHANGE UP (ref 27–34)
MCHC RBC-ENTMCNC: 33.8 GM/DL — SIGNIFICANT CHANGE UP (ref 32–36)
MCV RBC AUTO: 89.6 FL — SIGNIFICANT CHANGE UP (ref 80–100)
MONOCYTES # BLD AUTO: 1.05 K/UL — HIGH (ref 0–0.9)
MONOCYTES NFR BLD AUTO: 7 % — SIGNIFICANT CHANGE UP (ref 2–14)
NEUTROPHILS # BLD AUTO: 9.73 K/UL — HIGH (ref 1.8–7.4)
NEUTROPHILS NFR BLD AUTO: 65 % — SIGNIFICANT CHANGE UP (ref 43–77)
PLATELET # BLD AUTO: 422 K/UL — HIGH (ref 150–400)
POTASSIUM SERPL-MCNC: 3.9 MMOL/L — SIGNIFICANT CHANGE UP (ref 3.5–5.3)
POTASSIUM SERPL-SCNC: 3.9 MMOL/L — SIGNIFICANT CHANGE UP (ref 3.5–5.3)
PROT SERPL-MCNC: 7.7 GM/DL — SIGNIFICANT CHANGE UP (ref 6–8.3)
RBC # BLD: 5.02 M/UL — SIGNIFICANT CHANGE UP (ref 4.2–5.8)
RBC # FLD: 12.8 % — SIGNIFICANT CHANGE UP (ref 10.3–14.5)
SODIUM SERPL-SCNC: 135 MMOL/L — SIGNIFICANT CHANGE UP (ref 135–145)
WBC # BLD: 14.97 K/UL — HIGH (ref 3.8–10.5)
WBC # FLD AUTO: 14.97 K/UL — HIGH (ref 3.8–10.5)

## 2021-01-18 PROCEDURE — 36415 COLL VENOUS BLD VENIPUNCTURE: CPT

## 2021-01-18 PROCEDURE — 99284 EMERGENCY DEPT VISIT MOD MDM: CPT

## 2021-01-18 PROCEDURE — 99284 EMERGENCY DEPT VISIT MOD MDM: CPT | Mod: 25

## 2021-01-18 PROCEDURE — 80053 COMPREHEN METABOLIC PANEL: CPT

## 2021-01-18 PROCEDURE — 96374 THER/PROPH/DIAG INJ IV PUSH: CPT

## 2021-01-18 PROCEDURE — 85025 COMPLETE CBC W/AUTO DIFF WBC: CPT

## 2021-01-18 RX ORDER — VANCOMYCIN HCL 1 G
1000 VIAL (EA) INTRAVENOUS ONCE
Refills: 0 | Status: COMPLETED | OUTPATIENT
Start: 2021-01-18 | End: 2021-01-18

## 2021-01-18 RX ORDER — CLINDAMYCIN PHOSPHATE GEL USP, 1% 10 MG/G
1 GEL TOPICAL
Qty: 1 | Refills: 0
Start: 2021-01-18

## 2021-01-18 RX ORDER — AZTREONAM 2 G
1 VIAL (EA) INJECTION
Qty: 20 | Refills: 0
Start: 2021-01-18

## 2021-01-18 RX ORDER — CEPHALEXIN 500 MG
1 CAPSULE ORAL
Qty: 28 | Refills: 0
Start: 2021-01-18

## 2021-01-18 RX ORDER — CEPHALEXIN 500 MG
500 CAPSULE ORAL ONCE
Refills: 0 | Status: COMPLETED | OUTPATIENT
Start: 2021-01-18 | End: 2021-01-18

## 2021-01-18 RX ORDER — IBUPROFEN 200 MG
600 TABLET ORAL ONCE
Refills: 0 | Status: COMPLETED | OUTPATIENT
Start: 2021-01-18 | End: 2021-01-18

## 2021-01-18 RX ADMIN — Medication 1 TABLET(S): at 00:53

## 2021-01-18 RX ADMIN — Medication 600 MILLIGRAM(S): at 01:22

## 2021-01-18 RX ADMIN — Medication 250 MILLIGRAM(S): at 01:22

## 2021-01-18 RX ADMIN — Medication 500 MILLIGRAM(S): at 00:53

## 2021-01-18 NOTE — ED PROVIDER NOTE - CONSTITUTIONAL, MLM
normal... PA: Morbidly obese male. well appearing, awake, alert, oriented to person, place, time/situation and in no apparent distress.

## 2021-01-18 NOTE — ED PROVIDER NOTE - OBJECTIVE STATEMENT
PA: Patient is a 40 year old morbidly obese male with PMHx of Diabetes, Hx of abscess on left leg requiring admission in the past, who presents to [facility] c/o boil on his pubic area that spontaneously burst this evening with bloody drainage. Patient normally does not shave that area but he did today in order to put dressing and tape on it. Patient DENIES severe pain in the boil, no fever or chills. ~Cl Kemp PA-C

## 2021-01-18 NOTE — ED ADULT TRIAGE NOTE - CHIEF COMPLAINT QUOTE
Pt presents to ED with c/o of abscess in right pelvis area.  States it burst.  Denies any pain or additional complaints at this time.

## 2021-01-18 NOTE — ED PROVIDER NOTE - PATIENT PORTAL LINK FT
You can access the FollowMyHealth Patient Portal offered by [facility] by registering at the following website: [email or web address]. By joining [employer]’s FollowMyHealth portal, you will also be able to view your health information using other applications (apps) compatible with our system.

## 2021-01-18 NOTE — ED PROVIDER NOTE - NSFOLLOWUPINSTRUCTIONS_ED_ALL_ED_FT
ABSCESS - General Information           Abscess    WHAT YOU NEED TO KNOW:    What is an abscess? An abscess is an area under the skin where pus (infected fluid) collects. An abscess is often caused by bacteria, fungi or other germs that get into an open wound. You can get an abscess anywhere on your body.    Skin Abscess         What increases my risk for an abscess?   •An animal bite      •A foreign object lodged under your skin      •Heavy or frequent sweating      •A health problem, such as diabetes or obesity      •Injecting illegal drugs      What are the signs and symptoms of an abscess? You may have a swollen mass that is red and painful. Pus may leak out of the mass. The pus will be white or yellow and may smell bad. You may have redness and pain days before the mass appears. You may have a fever and chills if the infection spreads.    How is an abscess diagnosed? Your healthcare provider will examine the area. He will check to see if your abscess is draining. A sample of fluid from your abscess may show what is causing your infection.    How is an abscess treated?   •Incision and drainage is a procedure used to remove pus and fluid from the abscess. Your healthcare provider will make a cut in the abscess so it can drain. Then gauze will be put into the wound and it will be covered with a bandage.      •Surgery may be needed to remove your abscess. Your healthcare provider may do this if the abscess is on your hands or buttocks. Surgery can decrease the risk that the abscess will come back.      What can I do to care for myself?   •Apply a warm compress to your abscess. This will help it open and drain. Wet a washcloth in warm, but not hot, water. Apply the compress for 10 minutes. Repeat this 4 times each day. Do not press on an abscess or try to open it with a needle. You may push the bacteria deeper or into your blood.      •Do not share your clothes, towels, or sheets with anyone. This can spread the infection to others.      •Wash your hands often. This can help prevent the spread of germs. Use soap and water or an alcohol-based hand rub.  Handwashing           What can I do to care for my wound after it is drained?   •Care for your wound as directed. If your healthcare provider says it is okay, carefully remove the bandage and gauze packing. You may need to soak the gauze to get it out of your wound. Clean your wound and the area around it as directed. Dry the area and put on new, clean bandages. Change your bandages when they get wet or dirty.      •Ask your healthcare provider how to change the gauze in your wound. Keep track of how many pieces of gauze are placed inside the wound. Do not put too much packing in the wound. Do not pack the gauze too tightly in your wound.      When should I seek immediate care?   •The area around your abscess becomes very painful, warm, or has red streaks.      •You have a fever and chills.      •Your heart is beating faster than usual.      •You feel faint or confused.      When should I call my doctor?   •Your abscess gets bigger.      •Your abscess returns.      •You have questions or concerns about your condition or care.      CARE AGREEMENT:    You have the right to help plan your care. Learn about your health condition and how it may be treated. Discuss treatment options with your healthcare providers to decide what care you want to receive. You always have the right to refuse treatment.

## 2021-01-18 NOTE — ED PROVIDER NOTE - CLINICAL SUMMARY MEDICAL DECISION MAKING FREE TEXT BOX
PA: Patient presents with already draining abscess of suprapubic area. Waiting on labs, ABX called into [facility]. Care transitioned to Dr. Carter. ~Cl Kemp PA-C PA: Patient presents with already draining abscess of suprapubic area. Waiting on labs, ABX called into [facility]. Care transitioned to Dr. Carter. ~Cl Kemp PA-C    Localized cellulitis around spontaneously drained abscess.    Will likely discharge if vitals improve and patient feeling better after antibiotics.

## 2021-01-18 NOTE — ED PROVIDER NOTE - PROGRESS NOTE DETAILS
PA: Patient presents with suprapubic boil, already draining. +Hx of diabetes, will check CBC, CMP. Ordered Bactrim DS and Keflex. ~Cl Kemp PA-C Waiting on labs, ABX called into [facility]. Care transitioned to Dr. Carter. ~Cl Kemp PA-C Anastasia Carter, DO:  Examined patient concurrently with LIS Kemp.  Agree with documentation of HPI, ROS, PE.  After more discussion with patient , he states he frequently gets boils under his pannus of his abdomen.  He said he had one there for 3 days and denies any pain associated with it.  States it spontaneously opened while he was on the toilet going to the bathroom and he only noticed because he saw pus and blood drip off of him.  He shaved and cleaned the area and applied gauze.  Due to history of abscess in left thigh area, he came to the ED to initiate antibiotics early.  He had routine bloodwork done 3 wks ago which shows normal renal function, normal CBC, glucose 130s, and Hgb A1c 6.7.  He denies fever, chills, abdominal pain, pain in genitalia.  Has localized redness on pubis area associated with spontaneously draining open abscess in right region of pubis.  No crepitus on exam.  No tenderness. Decision to get labs, give dose of IV vancomycin and if labs are normal then discharge home on keflex and bactrim. Patient feeling better; no pain, WBC elevated.  Now s/p IV dose of vanco.  Strict return precautions for pain, redness, swelling, fever, or inability to take antibiotics.

## 2021-01-18 NOTE — ED PROVIDER NOTE - SKIN, MLM
+2x1cm superficial oval shaped abscess with central opening noted suprapubic area, with bloody drainage. No fluctuance. +Mild tenderness. Mild surrounding erythema.

## 2021-01-18 NOTE — ED ADULT NURSE NOTE - OBJECTIVE STATEMENT
Pt presents to ER c/o burst abscess on right pubic area. Pt reports abscess burst tonight after shower; bloody drainage. Denies fever/chills. AO x 3 oriented to baseline, normal breathing pattern with no difficulty.

## 2021-01-18 NOTE — ED ADULT NURSE NOTE - NSIMPLEMENTINTERV_GEN_ALL_ED
Implemented All Universal Safety Interventions:  [place] to call system. Call bell, personal items and telephone within reach. Instruct patient to call for assistance. Room bathroom lighting operational. Non-slip footwear when patient is off stretcher. Physically safe environment: no spills, clutter or unnecessary equipment. Stretcher in lowest position, wheels locked, appropriate side rails in place.

## 2021-01-18 NOTE — ED PROVIDER NOTE - ATTENDING CONTRIBUTION TO CARE
Attending Contribution to Care: I, Anastasia Carter, performed the initial face to face bedside interview with this patient regarding history of present illness, review of symptoms and relevant past medical, social and family history.  I completed an independent physical examination.  I was the initial provider who evaluated this patient. I have signed out the follow up of any pending tests (i.e. labs, radiological studies) to the ACP.  I have communicated the patient’s plan of care and disposition with the ACP.

## 2022-03-24 NOTE — ED ADULT NURSE NOTE - SUICIDE SCREENING DEPRESSION
Negative
This was a shared visit with the KEVIN. I reviewed and verified the documentation and independently performed the documented:

## 2022-05-03 NOTE — PATIENT PROFILE ADULT - NSPROEDALEARNPREF_GEN_A_NUR
Quality 110: Preventive Care And Screening: Influenza Immunization: Influenza Immunization Administered during Influenza season Detail Level: Detailed Quality 111:Pneumonia Vaccination Status For Older Adults: Pneumococcal Vaccination not Administered or Previously Received, Reason not Otherwise Specified individual instruction

## 2022-07-19 NOTE — ED PROVIDER NOTE - CROS ED NEURO ALL NEG
Calling pt in regards to scheduling surgery. Informed pt that I have 08/26/2022 available at [facility] with Dr. Jabari Reyna. Pt verbalized understanding and in agreement with date and location. All questions answered. Encouraged pt to call or [employer] message office with any other questions or concerns.
negative...

## 2023-05-29 NOTE — DIETITIAN INITIAL EVALUATION ADULT. - REASON INDICATOR FOR ASSESSMENT
Discussed with patient that MRI shows disc herniation at L4-5 on the left and L5-S1 on the left.  Looks to be impinging on the nerve roots which are consistent with his pain pattern.  He has not improved with the oral medications or home exercise program.  He would like to proceed with epidural injection.  Will schedule for left L4-5, and L5-S1 TFESI.     He was informed that this is would take place at the new [facility] on [address] next to [employer].  He was informed to go to the 2nd floor for check in.  We dicussed sedation options and will proceed with IV sedation. The patient was given instructions that they should not eat for 8 hours and can only drink clear liquids (water or black coffee without cream/sugar) up until 2 hours before their scheduled time.  They were informed that they CANNOT drive themselves and must have a .  They were informed that they will get a phone call closer to the date of the procedure with more instructions including the time of arrival..    He denies that they are on blood thinning medications.  They were instructed regarding the anticoagulation requirements if they are on blood thinners.  All questions were answered.    Gee Ruiz     assessment

## 2024-04-09 NOTE — ED ADULT NURSE NOTE - SUICIDE SCREENING QUESTION 1
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  Reports to be doing some ambulation with 1 crutch.  Reports to have been grocery shopping 2 days ago.  However he cannot stand for any significant amount of time at.  Continues to be under treatment of the pain clinic outside the [place] system.    On today's exam he present with Farm[employer] swelling through the left lower leg otherwise there is full range of motion of the ankle without any excessive pain.    Plain x-rays of the left calcaneus were obtained today which are significant for showing further consolidation across the fusion site.  Hardware is intact and in place.    Assessment: Status post left subtalar joint fusion with a improving CRPS    Plan: I discussed with the patient that at this point we need to continue advancing his activities.  I would like him to wean himself off the crutches in the cam walker.  He was given a prescription for compression stockings.  He will continue working with physical therapy.    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  At that time he will be reevaluated with a new x-rays of the left calcaneus.    All questions were answered.    TT 20 minutes  
No

## 2024-11-22 NOTE — PROGRESS NOTE ADULT - NSHPATTENDINGPLANDISCUSS_GEN_ALL_CORE
patient, nursign, staff
patient, nursing, staff
patient, nursing, staff
Patient, nurse
Patient, nurse
Resident